# Patient Record
Sex: FEMALE | Race: WHITE | NOT HISPANIC OR LATINO
[De-identification: names, ages, dates, MRNs, and addresses within clinical notes are randomized per-mention and may not be internally consistent; named-entity substitution may affect disease eponyms.]

---

## 2017-03-29 ENCOUNTER — APPOINTMENT (OUTPATIENT)
Dept: PEDIATRIC RHEUMATOLOGY | Facility: CLINIC | Age: 22
End: 2017-03-29

## 2017-03-29 VITALS
HEIGHT: 68.9 IN | BODY MASS INDEX: 21.68 KG/M2 | WEIGHT: 146.39 LBS | DIASTOLIC BLOOD PRESSURE: 76 MMHG | HEART RATE: 70 BPM | TEMPERATURE: 99 F | SYSTOLIC BLOOD PRESSURE: 122 MMHG

## 2017-03-30 LAB
ALBUMIN SERPL ELPH-MCNC: 4.8 G/DL
ALP BLD-CCNC: 71 U/L
ALT SERPL-CCNC: 26 U/L
ANION GAP SERPL CALC-SCNC: 17 MMOL/L
AST SERPL-CCNC: 36 U/L
BASOPHILS # BLD AUTO: 0.02 K/UL
BASOPHILS NFR BLD AUTO: 0.1 %
BILIRUB SERPL-MCNC: 0.5 MG/DL
BUN SERPL-MCNC: 16 MG/DL
CALCIUM SERPL-MCNC: 10 MG/DL
CHLORIDE SERPL-SCNC: 101 MMOL/L
CO2 SERPL-SCNC: 23 MMOL/L
CREAT SERPL-MCNC: 0.88 MG/DL
CRP SERPL-MCNC: <0.2 MG/DL
EOSINOPHIL # BLD AUTO: 0.05 K/UL
EOSINOPHIL NFR BLD AUTO: 0.3 %
ERYTHROCYTE [SEDIMENTATION RATE] IN BLOOD BY WESTERGREN METHOD: 7 MM/HR
GLUCOSE SERPL-MCNC: 78 MG/DL
HCT VFR BLD CALC: 39.9 %
HGB BLD-MCNC: 12.9 G/DL
IMM GRANULOCYTES NFR BLD AUTO: 0.3 %
LYMPHOCYTES # BLD AUTO: 3.08 K/UL
LYMPHOCYTES NFR BLD AUTO: 21.4 %
MAN DIFF?: NORMAL
MCHC RBC-ENTMCNC: 26.4 PG
MCHC RBC-ENTMCNC: 32.3 GM/DL
MCV RBC AUTO: 81.6 FL
MONOCYTES # BLD AUTO: 0.57 K/UL
MONOCYTES NFR BLD AUTO: 4 %
NEUTROPHILS # BLD AUTO: 10.62 K/UL
NEUTROPHILS NFR BLD AUTO: 73.9 %
PLATELET # BLD AUTO: 307 K/UL
POTASSIUM SERPL-SCNC: 4.4 MMOL/L
PROT SERPL-MCNC: 7.5 G/DL
RBC # BLD: 4.89 M/UL
RBC # FLD: 13.2 %
SODIUM SERPL-SCNC: 141 MMOL/L
WBC # FLD AUTO: 14.38 K/UL

## 2017-04-25 ENCOUNTER — MEDICATION RENEWAL (OUTPATIENT)
Age: 22
End: 2017-04-25

## 2017-05-05 ENCOUNTER — RX RENEWAL (OUTPATIENT)
Age: 22
End: 2017-05-05

## 2017-06-15 ENCOUNTER — APPOINTMENT (OUTPATIENT)
Dept: PEDIATRIC RHEUMATOLOGY | Facility: CLINIC | Age: 22
End: 2017-06-15

## 2017-06-15 VITALS
BODY MASS INDEX: 21.36 KG/M2 | HEIGHT: 68.78 IN | HEART RATE: 69 BPM | TEMPERATURE: 98.5 F | WEIGHT: 144.18 LBS | SYSTOLIC BLOOD PRESSURE: 122 MMHG | DIASTOLIC BLOOD PRESSURE: 80 MMHG

## 2017-07-26 ENCOUNTER — MEDICATION RENEWAL (OUTPATIENT)
Age: 22
End: 2017-07-26

## 2017-07-26 ENCOUNTER — OTHER (OUTPATIENT)
Age: 22
End: 2017-07-26

## 2017-07-28 ENCOUNTER — OTHER (OUTPATIENT)
Age: 22
End: 2017-07-28

## 2017-08-10 ENCOUNTER — RX RENEWAL (OUTPATIENT)
Age: 22
End: 2017-08-10

## 2017-09-18 ENCOUNTER — RX RENEWAL (OUTPATIENT)
Age: 22
End: 2017-09-18

## 2017-09-21 ENCOUNTER — APPOINTMENT (OUTPATIENT)
Dept: PEDIATRIC RHEUMATOLOGY | Facility: CLINIC | Age: 22
End: 2017-09-21
Payer: MEDICAID

## 2017-09-21 VITALS
HEART RATE: 76 BPM | SYSTOLIC BLOOD PRESSURE: 118 MMHG | DIASTOLIC BLOOD PRESSURE: 74 MMHG | WEIGHT: 145.95 LBS | HEIGHT: 68.98 IN | BODY MASS INDEX: 21.62 KG/M2 | TEMPERATURE: 98.7 F

## 2017-09-21 PROCEDURE — 99215 OFFICE O/P EST HI 40 MIN: CPT

## 2017-09-22 LAB
ALBUMIN SERPL ELPH-MCNC: 4.5 G/DL
ALP BLD-CCNC: 68 U/L
ALT SERPL-CCNC: 16 U/L
ANION GAP SERPL CALC-SCNC: 15 MMOL/L
AST SERPL-CCNC: 21 U/L
BASOPHILS # BLD AUTO: 0.01 K/UL
BASOPHILS NFR BLD AUTO: 0.1 %
BILIRUB SERPL-MCNC: 0.6 MG/DL
BUN SERPL-MCNC: 14 MG/DL
CALCIUM SERPL-MCNC: 9.6 MG/DL
CHLORIDE SERPL-SCNC: 102 MMOL/L
CO2 SERPL-SCNC: 23 MMOL/L
CREAT SERPL-MCNC: 0.85 MG/DL
CRP SERPL-MCNC: <0.2 MG/DL
EOSINOPHIL # BLD AUTO: 0.14 K/UL
EOSINOPHIL NFR BLD AUTO: 1.9 %
ERYTHROCYTE [SEDIMENTATION RATE] IN BLOOD BY WESTERGREN METHOD: 7 MM/HR
GLUCOSE SERPL-MCNC: 90 MG/DL
HCT VFR BLD CALC: 39.6 %
HGB BLD-MCNC: 12.9 G/DL
IMM GRANULOCYTES NFR BLD AUTO: 0.1 %
LYMPHOCYTES # BLD AUTO: 2.02 K/UL
LYMPHOCYTES NFR BLD AUTO: 27.8 %
MAN DIFF?: NORMAL
MCHC RBC-ENTMCNC: 27.3 PG
MCHC RBC-ENTMCNC: 32.6 GM/DL
MCV RBC AUTO: 83.9 FL
MONOCYTES # BLD AUTO: 0.68 K/UL
MONOCYTES NFR BLD AUTO: 9.4 %
NEUTROPHILS # BLD AUTO: 4.41 K/UL
NEUTROPHILS NFR BLD AUTO: 60.7 %
PLATELET # BLD AUTO: 277 K/UL
POTASSIUM SERPL-SCNC: 4.6 MMOL/L
PROT SERPL-MCNC: 6.9 G/DL
RBC # BLD: 4.72 M/UL
RBC # FLD: 12.6 %
SODIUM SERPL-SCNC: 140 MMOL/L
WBC # FLD AUTO: 7.27 K/UL

## 2017-12-21 ENCOUNTER — APPOINTMENT (OUTPATIENT)
Dept: PEDIATRIC RHEUMATOLOGY | Facility: CLINIC | Age: 22
End: 2017-12-21
Payer: MEDICAID

## 2017-12-21 VITALS
SYSTOLIC BLOOD PRESSURE: 126 MMHG | HEIGHT: 69.49 IN | BODY MASS INDEX: 20.97 KG/M2 | WEIGHT: 144.84 LBS | TEMPERATURE: 97.3 F | HEART RATE: 88 BPM | DIASTOLIC BLOOD PRESSURE: 80 MMHG

## 2017-12-21 PROCEDURE — 99215 OFFICE O/P EST HI 40 MIN: CPT

## 2017-12-22 LAB
ALBUMIN SERPL ELPH-MCNC: 4.5 G/DL
ALP BLD-CCNC: 63 U/L
ALT SERPL-CCNC: 16 U/L
ANION GAP SERPL CALC-SCNC: 16 MMOL/L
AST SERPL-CCNC: 24 U/L
BASOPHILS # BLD AUTO: 0.01 K/UL
BASOPHILS NFR BLD AUTO: 0.1 %
BILIRUB SERPL-MCNC: 0.7 MG/DL
BUN SERPL-MCNC: 15 MG/DL
CALCIUM SERPL-MCNC: 9.8 MG/DL
CHLORIDE SERPL-SCNC: 103 MMOL/L
CO2 SERPL-SCNC: 23 MMOL/L
CREAT SERPL-MCNC: 0.86 MG/DL
CRP SERPL-MCNC: <0.2 MG/DL
EOSINOPHIL # BLD AUTO: 0.1 K/UL
EOSINOPHIL NFR BLD AUTO: 1.2
ERYTHROCYTE [SEDIMENTATION RATE] IN BLOOD BY WESTERGREN METHOD: 6 MM/HR
GLUCOSE SERPL-MCNC: 103 MG/DL
HCT VFR BLD CALC: 38.6 %
HGB BLD-MCNC: 12.3 G/DL
IMM GRANULOCYTES NFR BLD AUTO: 0.2 %
LYMPHOCYTES # BLD AUTO: 2.06 K/UL
LYMPHOCYTES NFR BLD AUTO: 25.2 %
MAN DIFF?: NORMAL
MCHC RBC-ENTMCNC: 26.9 PG
MCHC RBC-ENTMCNC: 31.9 GM/DL
MCV RBC AUTO: 84.3 FL
MONOCYTES # BLD AUTO: 0.43 K/UL
MONOCYTES NFR BLD AUTO: 5.3 %
NEUTROPHILS # BLD AUTO: 5.55 K/UL
NEUTROPHILS NFR BLD AUTO: 68 %
PLATELET # BLD AUTO: 281 K/UL
POTASSIUM SERPL-SCNC: 4.2 MMOL/L
PROT SERPL-MCNC: 6.9 G/DL
RBC # BLD: 4.58 M/UL
RBC # FLD: 13 %
SODIUM SERPL-SCNC: 142 MMOL/L
WBC # FLD AUTO: 8.17 K/UL

## 2018-01-02 ENCOUNTER — RX RENEWAL (OUTPATIENT)
Age: 23
End: 2018-01-02

## 2018-01-02 LAB
ADJUSTED MITOGEN: 8.19 IU/ML
ADJUSTED TB AG: 0.01 IU/ML
M TB IFN-G BLD-IMP: NEGATIVE
QUANTIFERON GOLD NIL: 0.02 IU/ML

## 2018-03-28 ENCOUNTER — APPOINTMENT (OUTPATIENT)
Dept: PEDIATRIC RHEUMATOLOGY | Facility: CLINIC | Age: 23
End: 2018-03-28
Payer: MEDICAID

## 2018-03-28 VITALS
DIASTOLIC BLOOD PRESSURE: 81 MMHG | HEIGHT: 69.02 IN | HEART RATE: 70 BPM | WEIGHT: 142.42 LBS | BODY MASS INDEX: 21.09 KG/M2 | SYSTOLIC BLOOD PRESSURE: 119 MMHG | TEMPERATURE: 97.8 F

## 2018-03-28 LAB
ALBUMIN SERPL ELPH-MCNC: 4.9 G/DL
ALP BLD-CCNC: 69 U/L
ALT SERPL-CCNC: 19 U/L
ANION GAP SERPL CALC-SCNC: 15 MMOL/L
AST SERPL-CCNC: 27 U/L
BASOPHILS # BLD AUTO: 0.01 K/UL
BASOPHILS NFR BLD AUTO: 0.1 %
BILIRUB SERPL-MCNC: 0.7 MG/DL
BUN SERPL-MCNC: 17 MG/DL
CALCIUM SERPL-MCNC: 10 MG/DL
CHLORIDE SERPL-SCNC: 103 MMOL/L
CO2 SERPL-SCNC: 22 MMOL/L
CREAT SERPL-MCNC: 0.99 MG/DL
CRP SERPL-MCNC: <0.2 MG/DL
EOSINOPHIL # BLD AUTO: 0.09 K/UL
EOSINOPHIL NFR BLD AUTO: 1.1 %
ERYTHROCYTE [SEDIMENTATION RATE] IN BLOOD BY WESTERGREN METHOD: 15 MM/HR
GLUCOSE SERPL-MCNC: 81 MG/DL
HCT VFR BLD CALC: 41.6 %
HGB BLD-MCNC: 13.6 G/DL
IMM GRANULOCYTES NFR BLD AUTO: 0.1 %
LYMPHOCYTES # BLD AUTO: 2.26 K/UL
LYMPHOCYTES NFR BLD AUTO: 28.5 %
MAN DIFF?: NORMAL
MCHC RBC-ENTMCNC: 26.3 PG
MCHC RBC-ENTMCNC: 32.7 GM/DL
MCV RBC AUTO: 80.5 FL
MONOCYTES # BLD AUTO: 0.48 K/UL
MONOCYTES NFR BLD AUTO: 6 %
NEUTROPHILS # BLD AUTO: 5.09 K/UL
NEUTROPHILS NFR BLD AUTO: 64.2 %
PLATELET # BLD AUTO: 277 K/UL
POTASSIUM SERPL-SCNC: 4.7 MMOL/L
PROT SERPL-MCNC: 7.8 G/DL
RBC # BLD: 5.17 M/UL
RBC # FLD: 13.5 %
SODIUM SERPL-SCNC: 140 MMOL/L
WBC # FLD AUTO: 7.94 K/UL

## 2018-03-28 PROCEDURE — 99204 OFFICE O/P NEW MOD 45 MIN: CPT

## 2018-03-28 RX ORDER — METRONIDAZOLE 7.5 MG/G
0.75 CREAM TOPICAL
Qty: 45 | Refills: 0 | Status: ACTIVE | COMMUNITY
Start: 2017-12-10

## 2018-03-28 RX ORDER — KETOROLAC TROMETHAMINE 5 MG/ML
0.5 SOLUTION OPHTHALMIC
Qty: 5 | Refills: 0 | Status: DISCONTINUED | COMMUNITY
Start: 2017-10-28

## 2018-07-02 ENCOUNTER — RX RENEWAL (OUTPATIENT)
Age: 23
End: 2018-07-02

## 2018-07-05 ENCOUNTER — APPOINTMENT (OUTPATIENT)
Dept: PEDIATRIC RHEUMATOLOGY | Facility: CLINIC | Age: 23
End: 2018-07-05
Payer: MEDICAID

## 2018-07-05 VITALS
HEART RATE: 59 BPM | WEIGHT: 141.1 LBS | TEMPERATURE: 98.8 F | SYSTOLIC BLOOD PRESSURE: 120 MMHG | DIASTOLIC BLOOD PRESSURE: 72 MMHG | BODY MASS INDEX: 20.66 KG/M2 | HEIGHT: 69.25 IN

## 2018-07-05 LAB
BASOPHILS # BLD AUTO: 0.02 K/UL
BASOPHILS NFR BLD AUTO: 0.2 %
EOSINOPHIL # BLD AUTO: 0.17 K/UL
EOSINOPHIL NFR BLD AUTO: 1.6 %
HCT VFR BLD CALC: 41.4 %
HGB BLD-MCNC: 12.6 G/DL
IMM GRANULOCYTES NFR BLD AUTO: 0.2 %
LYMPHOCYTES # BLD AUTO: 2.55 K/UL
LYMPHOCYTES NFR BLD AUTO: 23.5 %
MAN DIFF?: NORMAL
MCHC RBC-ENTMCNC: 25 PG
MCHC RBC-ENTMCNC: 30.4 GM/DL
MCV RBC AUTO: 82.3 FL
MONOCYTES # BLD AUTO: 0.64 K/UL
MONOCYTES NFR BLD AUTO: 5.9 %
NEUTROPHILS # BLD AUTO: 7.47 K/UL
NEUTROPHILS NFR BLD AUTO: 68.6 %
PLATELET # BLD AUTO: 339 K/UL
RBC # BLD: 5.03 M/UL
RBC # FLD: 13.6 %
WBC # FLD AUTO: 10.87 K/UL

## 2018-07-05 PROCEDURE — 99214 OFFICE O/P EST MOD 30 MIN: CPT

## 2018-07-06 LAB
ALBUMIN SERPL ELPH-MCNC: 4.6 G/DL
ALP BLD-CCNC: 71 U/L
ALT SERPL-CCNC: 17 U/L
ANION GAP SERPL CALC-SCNC: 19 MMOL/L
AST SERPL-CCNC: 26 U/L
BILIRUB SERPL-MCNC: 0.4 MG/DL
BUN SERPL-MCNC: 19 MG/DL
CALCIUM SERPL-MCNC: 9.8 MG/DL
CHLORIDE SERPL-SCNC: 103 MMOL/L
CO2 SERPL-SCNC: 19 MMOL/L
CREAT SERPL-MCNC: 0.93 MG/DL
CRP SERPL-MCNC: <0.2 MG/DL
ERYTHROCYTE [SEDIMENTATION RATE] IN BLOOD BY WESTERGREN METHOD: 3 MM/HR
GLUCOSE SERPL-MCNC: 80 MG/DL
POTASSIUM SERPL-SCNC: 4.9 MMOL/L
PROT SERPL-MCNC: 7.1 G/DL
SODIUM SERPL-SCNC: 141 MMOL/L

## 2018-10-10 ENCOUNTER — APPOINTMENT (OUTPATIENT)
Dept: PEDIATRIC RHEUMATOLOGY | Facility: CLINIC | Age: 23
End: 2018-10-10
Payer: COMMERCIAL

## 2018-10-10 VITALS
BODY MASS INDEX: 21.44 KG/M2 | HEART RATE: 66 BPM | WEIGHT: 146.39 LBS | HEIGHT: 69.17 IN | TEMPERATURE: 98.5 F | DIASTOLIC BLOOD PRESSURE: 83 MMHG | SYSTOLIC BLOOD PRESSURE: 136 MMHG

## 2018-10-10 LAB
BASOPHILS # BLD AUTO: 0.01 K/UL
BASOPHILS NFR BLD AUTO: 0.1 %
EOSINOPHIL # BLD AUTO: 0.1 K/UL
EOSINOPHIL NFR BLD AUTO: 1.2 %
HCT VFR BLD CALC: 41.8 %
HGB BLD-MCNC: 13 G/DL
IMM GRANULOCYTES NFR BLD AUTO: 0.2 %
LYMPHOCYTES # BLD AUTO: 2.5 K/UL
LYMPHOCYTES NFR BLD AUTO: 29.2 %
MAN DIFF?: NORMAL
MCHC RBC-ENTMCNC: 25.6 PG
MCHC RBC-ENTMCNC: 31.1 GM/DL
MCV RBC AUTO: 82.3 FL
MONOCYTES # BLD AUTO: 0.52 K/UL
MONOCYTES NFR BLD AUTO: 6.1 %
NEUTROPHILS # BLD AUTO: 5.41 K/UL
NEUTROPHILS NFR BLD AUTO: 63.2 %
PLATELET # BLD AUTO: 328 K/UL
RBC # BLD: 5.08 M/UL
RBC # FLD: 13.9 %
WBC # FLD AUTO: 8.56 K/UL

## 2018-10-10 PROCEDURE — 99215 OFFICE O/P EST HI 40 MIN: CPT

## 2018-10-11 LAB
ALBUMIN SERPL ELPH-MCNC: 4.9 G/DL
ALP BLD-CCNC: 74 U/L
ALT SERPL-CCNC: 25 U/L
ANION GAP SERPL CALC-SCNC: 12 MMOL/L
AST SERPL-CCNC: 38 U/L
BILIRUB SERPL-MCNC: 0.8 MG/DL
BUN SERPL-MCNC: 12 MG/DL
CALCIUM SERPL-MCNC: 10.1 MG/DL
CHLORIDE SERPL-SCNC: 103 MMOL/L
CO2 SERPL-SCNC: 25 MMOL/L
CREAT SERPL-MCNC: 0.94 MG/DL
CRP SERPL-MCNC: <0.1 MG/DL
ERYTHROCYTE [SEDIMENTATION RATE] IN BLOOD BY WESTERGREN METHOD: 12 MM/HR
GLUCOSE SERPL-MCNC: 73 MG/DL
M TB IFN-G BLD-IMP: NEGATIVE
POTASSIUM SERPL-SCNC: 4.6 MMOL/L
PROT SERPL-MCNC: 7.6 G/DL
QUANTIFERON TB PLUS MITOGEN MINUS NIL: >10 IU/ML
QUANTIFERON TB PLUS NIL: 0.06 IU/ML
QUANTIFERON TB PLUS TB1 MINUS NIL: 0 IU/ML
QUANTIFERON TB PLUS TB2 MINUS NIL: 0.02 IU/ML
SODIUM SERPL-SCNC: 140 MMOL/L

## 2019-01-16 ENCOUNTER — APPOINTMENT (OUTPATIENT)
Dept: PEDIATRIC RHEUMATOLOGY | Facility: CLINIC | Age: 24
End: 2019-01-16
Payer: COMMERCIAL

## 2019-01-16 VITALS
TEMPERATURE: 98.4 F | DIASTOLIC BLOOD PRESSURE: 75 MMHG | WEIGHT: 145.73 LBS | BODY MASS INDEX: 21.34 KG/M2 | SYSTOLIC BLOOD PRESSURE: 121 MMHG | HEIGHT: 69.17 IN | HEART RATE: 67 BPM

## 2019-01-16 LAB
ALBUMIN SERPL ELPH-MCNC: 4.9 G/DL
ALP BLD-CCNC: 65 U/L
ALT SERPL-CCNC: 15 U/L
ANION GAP SERPL CALC-SCNC: 10 MMOL/L
AST SERPL-CCNC: 28 U/L
BASOPHILS # BLD AUTO: 0.01 K/UL
BASOPHILS NFR BLD AUTO: 0.2 %
BILIRUB SERPL-MCNC: 0.7 MG/DL
BUN SERPL-MCNC: 13 MG/DL
CALCIUM SERPL-MCNC: 9.8 MG/DL
CHLORIDE SERPL-SCNC: 103 MMOL/L
CO2 SERPL-SCNC: 25 MMOL/L
CREAT SERPL-MCNC: 0.95 MG/DL
CRP SERPL-MCNC: 0.13 MG/DL
EOSINOPHIL # BLD AUTO: 0.09 K/UL
EOSINOPHIL NFR BLD AUTO: 1.4 %
ERYTHROCYTE [SEDIMENTATION RATE] IN BLOOD BY WESTERGREN METHOD: 12 MM/HR
GLUCOSE SERPL-MCNC: 86 MG/DL
HCT VFR BLD CALC: 39.2 %
HGB BLD-MCNC: 12.4 G/DL
IMM GRANULOCYTES NFR BLD AUTO: 0.2 %
LYMPHOCYTES # BLD AUTO: 2.12 K/UL
LYMPHOCYTES NFR BLD AUTO: 32.9 %
MAN DIFF?: NORMAL
MCHC RBC-ENTMCNC: 25.7 PG
MCHC RBC-ENTMCNC: 31.6 GM/DL
MCV RBC AUTO: 81.3 FL
MONOCYTES # BLD AUTO: 0.34 K/UL
MONOCYTES NFR BLD AUTO: 5.3 %
NEUTROPHILS # BLD AUTO: 3.87 K/UL
NEUTROPHILS NFR BLD AUTO: 60 %
PLATELET # BLD AUTO: 280 K/UL
POTASSIUM SERPL-SCNC: 3.9 MMOL/L
PROT SERPL-MCNC: 7.2 G/DL
RBC # BLD: 4.82 M/UL
RBC # FLD: 14 %
SODIUM SERPL-SCNC: 138 MMOL/L
WBC # FLD AUTO: 6.44 K/UL

## 2019-01-16 PROCEDURE — 99213 OFFICE O/P EST LOW 20 MIN: CPT

## 2019-01-16 RX ORDER — ADALIMUMAB 40MG/0.8ML
40 KIT SUBCUTANEOUS
Qty: 0 | Refills: 0 | Status: COMPLETED | OUTPATIENT
Start: 2019-01-16

## 2019-01-16 NOTE — REVIEW OF SYSTEMS
[NI] : Endocrine [Nl] : Hematologic/Lymphatic [Rash] : rash [Menarche] : ~T menarche [Change in Activity] : no change in activity [Fever] : no fever [Eye Pain] : no eye pain [Redness] : no redness [Change in Vision] : no change in vision  [Nasal Stuffiness] : no nasal congestion [Sore Throat] : no sore throat [Exercise Intolerance] : no exercise intolerance [Tachypnea] : no tachypnea [Wheezing] : no wheezing [Cough] : no cough [Shortness of Breath] : no shortness of breath [Change in Appetite] : no change in appetite [Irregular Periods] : no irregular periods [Limping] : no limping [Joint Pains] : no arthralgias [Fainting] : no fainting [Headache] : no headache [Bruising] : no tendency for easy bruising [Swollen Glands] : no lymphadenopathy [Seasonal Allergies] : no seasonal allergies [Smokers in Home] : no one in home smokes [FreeTextEntry1] : [facility]\par Had influenza virus vaccine - Oct. 2014 - Fall 2018

## 2019-01-16 NOTE — SOCIAL HISTORY
[Mother] : mother [Father] : father [College] : College [FreeTextEntry1] : graduating from college in May 2018 - working Full time

## 2019-01-16 NOTE — HISTORY OF PRESENT ILLNESS
[___ Month(s) Ago] : [unfilled] month(s) ago [Cataracts] : cataracts [None] : The patient is currently asymptomatic [Unlimited ADLs] : able to do activities of daily living without limitations [Unlimited Sports] : able to participate in sports without limitations [0] : 0 [Noncontributory] : The patient's family history was noncontributory [de-identified] : Last seen Oct. 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 Hill - Nov. 2018 [FreeTextEntry2] : S/P Cataract surgery- August 2014

## 2019-01-16 NOTE — PHYSICAL EXAM
[Rash] : rash [Conjunctiva] : normal conjunctiva [Eyelids] : normal eyelids [Ears] : normal ears [Gums] : normal gums [Palate] : normal palate [Cardiac Auscultation] : normal cardiac auscultation  [Respiratory Effort] : normal respiratory effort [Auscultation] : lungs clear to auscultation [Liver] : normal liver [Spleen] : normal spleen [Range Of Motion] : full  range of motion [Gait] : normal gait [Normal] : normal [Not Examined] : not examined [0] : 0 [Peripheral Edema] : no peripheral edema  [Tenderness] : non tender [Mass ___ cm] : no masses were palpated [FreeTextEntry1] : well appearing

## 2019-03-23 ENCOUNTER — TRANSCRIPTION ENCOUNTER (OUTPATIENT)
Age: 24
End: 2019-03-23

## 2019-04-18 ENCOUNTER — APPOINTMENT (OUTPATIENT)
Dept: PEDIATRIC RHEUMATOLOGY | Facility: CLINIC | Age: 24
End: 2019-04-18
Payer: COMMERCIAL

## 2019-04-18 VITALS
HEART RATE: 80 BPM | TEMPERATURE: 98.2 F | DIASTOLIC BLOOD PRESSURE: 82 MMHG | SYSTOLIC BLOOD PRESSURE: 148 MMHG | HEIGHT: 69.09 IN | WEIGHT: 149.25 LBS | BODY MASS INDEX: 22.11 KG/M2

## 2019-04-18 LAB
BASOPHILS # BLD AUTO: 0.02 K/UL
BASOPHILS NFR BLD AUTO: 0.2 %
EOSINOPHIL # BLD AUTO: 0.09 K/UL
EOSINOPHIL NFR BLD AUTO: 1.1 %
HCT VFR BLD CALC: 40 %
HGB BLD-MCNC: 12.4 G/DL
IMM GRANULOCYTES NFR BLD AUTO: 0.2 %
LYMPHOCYTES # BLD AUTO: 2 K/UL
LYMPHOCYTES NFR BLD AUTO: 24.5 %
MAN DIFF?: NORMAL
MCHC RBC-ENTMCNC: 26.2 PG
MCHC RBC-ENTMCNC: 31 GM/DL
MCV RBC AUTO: 84.4 FL
MONOCYTES # BLD AUTO: 0.58 K/UL
MONOCYTES NFR BLD AUTO: 7.1 %
NEUTROPHILS # BLD AUTO: 5.45 K/UL
NEUTROPHILS NFR BLD AUTO: 66.9 %
PLATELET # BLD AUTO: 267 K/UL
RBC # BLD: 4.74 M/UL
RBC # FLD: 12.9 %
WBC # FLD AUTO: 8.16 K/UL

## 2019-04-18 PROCEDURE — 99214 OFFICE O/P EST MOD 30 MIN: CPT

## 2019-04-18 NOTE — REVIEW OF SYSTEMS
[NI] : Endocrine [Nl] : Hematologic/Lymphatic [Menarche] : ~T menarche [Fainting] : fainting [Change in Activity] : no change in activity [Fever] : no fever [Eye Pain] : no eye pain [Rash] : no rash [Redness] : no redness [Change in Vision] : no change in vision  [Nasal Stuffiness] : no nasal congestion [Sore Throat] : no sore throat [Tachypnea] : no tachypnea [Exercise Intolerance] : no exercise intolerance [Wheezing] : no wheezing [Cough] : no cough [Change in Appetite] : no change in appetite [Shortness of Breath] : no shortness of breath [Limping] : no limping [Irregular Periods] : no irregular periods [Headache] : no headache [Joint Pains] : no arthralgias [Bruising] : no tendency for easy bruising [Swollen Glands] : no lymphadenopathy [Smokers in Home] : no one in home smokes [Seasonal Allergies] : no seasonal allergies [FreeTextEntry1] : records [facility] at [facility]\par Had influenza virus vaccine - Oct. 2014

## 2019-04-18 NOTE — HISTORY OF PRESENT ILLNESS
[___ Month(s) Ago] : [unfilled] month(s) ago [None] : The patient is currently asymptomatic [Cataracts] : cataracts [Unlimited ADLs] : able to do activities of daily living without limitations [Unlimited Sports] : able to participate in sports without limitations [0] : 0 [de-identified] : Last seen Jan 2019 [FreeTextEntry1] : 4-18-19\par Back on Humira as had a flare of the eyes - Humira every 4 weeks\par \par Recently seen ophthalmologist - Dr Hill and he has recommended seeing Dr Egan as uncertain how to wean her further given her recent flare\par needs a new authorization for her Humira and was unable to get the last order because of this\par No major complaints - no joint pain or swelling\par No rash\par No intercurrent illnesses \par Asking about transition to an adult MD\par \par \par  [FreeTextEntry4] : Dr. Hill - April 2019 [FreeTextEntry3] : 2008 [FreeTextEntry2] : S/P Cataract surgery- August 2014

## 2019-04-18 NOTE — PHYSICAL EXAM
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

## 2019-04-18 NOTE — SOCIAL HISTORY
[Mother] : mother [Father] : father [College] : College [FreeTextEntry1] : graduating from college in May 2018

## 2019-04-19 ENCOUNTER — OTHER (OUTPATIENT)
Age: 24
End: 2019-04-19

## 2019-04-19 LAB
ALBUMIN SERPL ELPH-MCNC: 4.8 G/DL
ALP BLD-CCNC: 69 U/L
ALT SERPL-CCNC: 22 U/L
ANION GAP SERPL CALC-SCNC: 12 MMOL/L
AST SERPL-CCNC: 31 U/L
BILIRUB SERPL-MCNC: 0.7 MG/DL
BUN SERPL-MCNC: 13 MG/DL
CALCIUM SERPL-MCNC: 9.5 MG/DL
CHLORIDE SERPL-SCNC: 103 MMOL/L
CO2 SERPL-SCNC: 25 MMOL/L
CREAT SERPL-MCNC: 0.86 MG/DL
CRP SERPL-MCNC: 0.11 MG/DL
ERYTHROCYTE [SEDIMENTATION RATE] IN BLOOD BY WESTERGREN METHOD: 22 MM/HR
GLUCOSE SERPL-MCNC: 85 MG/DL
POTASSIUM SERPL-SCNC: 4.4 MMOL/L
PROT SERPL-MCNC: 7.1 G/DL
SODIUM SERPL-SCNC: 140 MMOL/L

## 2019-04-24 ENCOUNTER — OTHER (OUTPATIENT)
Age: 24
End: 2019-04-24

## 2019-04-29 ENCOUNTER — APPOINTMENT (OUTPATIENT)
Dept: OPHTHALMOLOGY | Facility: CLINIC | Age: 24
End: 2019-04-29
Payer: COMMERCIAL

## 2019-04-29 DIAGNOSIS — H20.11 CHRONIC IRIDOCYCLITIS, RIGHT EYE: ICD-10-CM

## 2019-04-29 DIAGNOSIS — H20.10 CHRONIC IRIDOCYCLITIS, UNSPECIFIED EYE: ICD-10-CM

## 2019-04-29 DIAGNOSIS — Z96.1 PRESENCE OF INTRAOCULAR LENS: ICD-10-CM

## 2019-04-29 PROCEDURE — 92004 COMPRE OPH EXAM NEW PT 1/>: CPT

## 2019-05-25 ENCOUNTER — LABORATORY RESULT (OUTPATIENT)
Age: 24
End: 2019-05-25

## 2019-05-28 LAB
B BURGDOR IGG+IGM SER QL IB: NORMAL
CONFIRM: 25.4 SEC
DEPRECATED KAPPA LC FREE/LAMBDA SER: 0.8 RATIO
DRVVT IMM 1:2 NP PPP: NORMAL
DRVVT SCREEN TO CONFIRM RATIO: 0.79 RATIO
IGA SER QL IEP: 180 MG/DL
IGG SER QL IEP: 713 MG/DL
IGM SER QL IEP: 201 MG/DL
KAPPA LC CSF-MCNC: 0.91 MG/DL
KAPPA LC SERPL-MCNC: 0.73 MG/DL
MPO AB + PR3 PNL SER: NORMAL
SCREEN DRVVT: 25 SEC
T GONDII AB SER-IMP: NEGATIVE
T GONDII IGG SER QL: <3 IU/ML
TSH SERPL-ACNC: 1.72 UIU/ML

## 2019-05-29 LAB
ACE BLD-CCNC: 68 U/L
B HENSELAE IGG SER-ACNC: NORMAL TITER
B HENSELAE IGM SER QL: NORMAL TITER
B QUINTANA IGG SER QL: NORMAL TITER
B QUINTANA IGM SER QL: NORMAL TITER
HLA-B27 RELATED AG QL: NORMAL

## 2019-05-31 LAB — T CANIS AB FLD-ACNC: POSITIVE

## 2019-06-01 LAB
ADALIMUMAB AB SERPL-MCNC: 40 NG/ML
ADALIMUMAB SERPL-MCNC: 3.4 UG/ML

## 2019-06-07 ENCOUNTER — APPOINTMENT (OUTPATIENT)
Dept: OPHTHALMOLOGY | Facility: CLINIC | Age: 24
End: 2019-06-07

## 2019-07-01 ENCOUNTER — RX RENEWAL (OUTPATIENT)
Age: 24
End: 2019-07-01

## 2019-08-21 ENCOUNTER — APPOINTMENT (OUTPATIENT)
Dept: PEDIATRIC RHEUMATOLOGY | Facility: CLINIC | Age: 24
End: 2019-08-21
Payer: COMMERCIAL

## 2019-08-21 VITALS
DIASTOLIC BLOOD PRESSURE: 82 MMHG | TEMPERATURE: 97.8 F | HEIGHT: 69.37 IN | SYSTOLIC BLOOD PRESSURE: 130 MMHG | HEART RATE: 62 BPM | WEIGHT: 148 LBS | BODY MASS INDEX: 21.67 KG/M2

## 2019-08-21 LAB
ALBUMIN SERPL ELPH-MCNC: 4.5 G/DL
ALP BLD-CCNC: 58 U/L
ALT SERPL-CCNC: 13 U/L
ANION GAP SERPL CALC-SCNC: 12 MMOL/L
AST SERPL-CCNC: 21 U/L
BASOPHILS # BLD AUTO: 0.02 K/UL
BASOPHILS NFR BLD AUTO: 0.3 %
BILIRUB SERPL-MCNC: 0.6 MG/DL
BUN SERPL-MCNC: 16 MG/DL
CALCIUM SERPL-MCNC: 10 MG/DL
CHLORIDE SERPL-SCNC: 103 MMOL/L
CO2 SERPL-SCNC: 24 MMOL/L
CREAT SERPL-MCNC: 0.92 MG/DL
CRP SERPL-MCNC: <0.1 MG/DL
EOSINOPHIL # BLD AUTO: 0.12 K/UL
EOSINOPHIL NFR BLD AUTO: 1.6 %
ERYTHROCYTE [SEDIMENTATION RATE] IN BLOOD BY WESTERGREN METHOD: 8 MM/HR
GLUCOSE SERPL-MCNC: 90 MG/DL
HCT VFR BLD CALC: 40.3 %
HGB BLD-MCNC: 12.5 G/DL
IMM GRANULOCYTES NFR BLD AUTO: 0.3 %
LYMPHOCYTES # BLD AUTO: 2.39 K/UL
LYMPHOCYTES NFR BLD AUTO: 31.8 %
MAN DIFF?: NORMAL
MCHC RBC-ENTMCNC: 26.8 PG
MCHC RBC-ENTMCNC: 31 GM/DL
MCV RBC AUTO: 86.3 FL
MONOCYTES # BLD AUTO: 0.46 K/UL
MONOCYTES NFR BLD AUTO: 6.1 %
NEUTROPHILS # BLD AUTO: 4.5 K/UL
NEUTROPHILS NFR BLD AUTO: 59.9 %
PLATELET # BLD AUTO: 319 K/UL
POTASSIUM SERPL-SCNC: 5 MMOL/L
PROT SERPL-MCNC: 6.9 G/DL
RBC # BLD: 4.67 M/UL
RBC # FLD: 13 %
SODIUM SERPL-SCNC: 139 MMOL/L
WBC # FLD AUTO: 7.51 K/UL

## 2019-08-21 PROCEDURE — 99215 OFFICE O/P EST HI 40 MIN: CPT

## 2019-08-22 NOTE — PHYSICAL EXAM
[Rash] : rash [Conjunctiva] : normal conjunctiva [Eyelids] : normal eyelids [Ears] : normal ears [Gums] : normal gums [Palate] : normal palate [Cardiac Auscultation] : normal cardiac auscultation  [Respiratory Effort] : normal respiratory effort [Auscultation] : lungs clear to auscultation [Liver] : normal liver [Spleen] : normal spleen [Range Of Motion] : full  range of motion [Gait] : normal gait [Normal] : normal [0] : 0 [Not Examined] : not examined [Peripheral Edema] : no peripheral edema  [Tenderness] : non tender [Mass ___ cm] : no masses were palpated [FreeTextEntry1] : well appearing [de-identified] : Has a small 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

## 2019-08-22 NOTE — REVIEW OF SYSTEMS
[NI] : Endocrine [Nl] : Hematologic/Lymphatic [Menarche] : ~T menarche [Fainting] : fainting [Fever] : no fever [Change in Activity] : no change in activity [Rash] : no rash [Eye Pain] : no eye pain [Redness] : no redness [Change in Vision] : no change in vision  [Nasal Stuffiness] : no nasal congestion [Exercise Intolerance] : no exercise intolerance [Sore Throat] : no sore throat [Wheezing] : no wheezing [Tachypnea] : no tachypnea [Cough] : no cough [Shortness of Breath] : no shortness of breath [Change in Appetite] : no change in appetite [Irregular Periods] : no irregular periods [Limping] : no limping [Joint Pains] : no arthralgias [Headache] : no headache [Bruising] : no tendency for easy bruising [Swollen Glands] : no lymphadenopathy [Seasonal Allergies] : no seasonal allergies [Smokers in Home] : no one in home smokes [FreeTextEntry1] : [facility] at [facility]\par Had influenza virus vaccine - Oct. 2014

## 2019-08-22 NOTE — HISTORY OF PRESENT ILLNESS
[___ Month(s) Ago] : [unfilled] month(s) ago [Cataracts] : cataracts [None] : The patient is currently asymptomatic [Unlimited ADLs] : able to do activities of daily living without limitations [Unlimited Sports] : able to participate in sports without limitations [0] : 0 [de-identified] : Last seen May 2019 [FreeTextEntry1] : 8-21-19\par Back on Humira as of had a flare of the eyes in July 2017 - Humira every 4 weeks\par \par Recently seen ophthalmologist - Dr Hill and he has recommended seeing Dr Egan as uncertain how to wean her further given her recent flare\par Saw Karen in April 2019 who noted that she may be flaring\par he recommended starting another medication such as Cellcept However after discussion with the family re side effects they decided not to take the medicine and therefore her Humira was increased to every 2 weeks\par 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 Hill - April 2019 [FreeTextEntry2] : S/P Cataract surgery- August 2014

## 2019-11-01 ENCOUNTER — RX RENEWAL (OUTPATIENT)
Age: 24
End: 2019-11-01

## 2019-11-27 ENCOUNTER — APPOINTMENT (OUTPATIENT)
Dept: PEDIATRIC RHEUMATOLOGY | Facility: CLINIC | Age: 24
End: 2019-11-27
Payer: COMMERCIAL

## 2019-11-27 VITALS
DIASTOLIC BLOOD PRESSURE: 80 MMHG | BODY MASS INDEX: 21.77 KG/M2 | HEART RATE: 80 BPM | SYSTOLIC BLOOD PRESSURE: 120 MMHG | TEMPERATURE: 98.2 F | HEIGHT: 68.94 IN | WEIGHT: 146.98 LBS

## 2019-11-27 DIAGNOSIS — Z00.00 ENCOUNTER FOR GENERAL ADULT MEDICAL EXAMINATION W/OUT ABNORMAL FINDINGS: ICD-10-CM

## 2019-11-27 PROCEDURE — 99215 OFFICE O/P EST HI 40 MIN: CPT

## 2019-11-27 NOTE — HISTORY OF PRESENT ILLNESS
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 Hill - April 2019 [FreeTextEntry2] : S/P Cataract surgery- August 2014

## 2019-11-27 NOTE — PHYSICAL EXAM
[Conjunctiva] : normal conjunctiva [Eyelids] : normal eyelids [Ears] : normal ears [Gums] : normal gums [Palate] : normal palate [Cardiac Auscultation] : normal cardiac auscultation  [Respiratory Effort] : normal respiratory effort [Auscultation] : lungs clear to auscultation [Liver] : normal liver [Spleen] : normal spleen [Range Of Motion] : full  range of motion [Gait] : normal gait [Normal] : normal [Not Examined] : not examined [0] : 0 [Rash] : no rash [Peripheral Edema] : no peripheral edema  [Tenderness] : non tender [Mass ___ cm] : no masses were palpated [FreeTextEntry1] : well appearing

## 2019-11-27 NOTE — REVIEW OF SYSTEMS
[NI] : Endocrine [Nl] : Hematologic/Lymphatic [Menarche] : ~T menarche [Fainting] : fainting [Change in Activity] : no change in activity [Fever] : no fever [Rash] : no rash [Eye Pain] : no eye pain [Redness] : no redness [Change in Vision] : no change in vision  [Nasal Stuffiness] : no nasal congestion [Sore Throat] : no sore throat [Exercise Intolerance] : no exercise intolerance [Tachypnea] : no tachypnea [Wheezing] : no wheezing [Cough] : no cough [Shortness of Breath] : no shortness of breath [Change in Appetite] : no change in appetite [Irregular Periods] : no irregular periods [Limping] : no limping [Joint Pains] : no arthralgias [Headache] : no headache [Bruising] : no tendency for easy bruising [Swollen Glands] : no lymphadenopathy [Seasonal Allergies] : no seasonal allergies [Smokers in Home] : no one in home smokes [FreeTextEntry1] : records kept at [facility]\par Had influenza virus vaccine - Oct. 2014

## 2019-11-29 LAB
ALBUMIN SERPL ELPH-MCNC: 4.8 G/DL
ALP BLD-CCNC: 56 U/L
ALT SERPL-CCNC: 16 U/L
ANION GAP SERPL CALC-SCNC: 14 MMOL/L
AST SERPL-CCNC: 22 U/L
BASOPHILS # BLD AUTO: 0.03 K/UL
BASOPHILS NFR BLD AUTO: 0.3 %
BILIRUB SERPL-MCNC: 0.7 MG/DL
BUN SERPL-MCNC: 15 MG/DL
CALCIUM SERPL-MCNC: 9.8 MG/DL
CHLORIDE SERPL-SCNC: 103 MMOL/L
CO2 SERPL-SCNC: 24 MMOL/L
CREAT SERPL-MCNC: 0.84 MG/DL
CRP SERPL-MCNC: 0.13 MG/DL
EOSINOPHIL # BLD AUTO: 0.09 K/UL
EOSINOPHIL NFR BLD AUTO: 1 %
ERYTHROCYTE [SEDIMENTATION RATE] IN BLOOD BY WESTERGREN METHOD: 31 MM/HR
GLUCOSE SERPL-MCNC: 87 MG/DL
HCT VFR BLD CALC: 41 %
HGB BLD-MCNC: 12.8 G/DL
IMM GRANULOCYTES NFR BLD AUTO: 0.2 %
LYMPHOCYTES # BLD AUTO: 2.04 K/UL
LYMPHOCYTES NFR BLD AUTO: 23.4 %
MAN DIFF?: NORMAL
MCHC RBC-ENTMCNC: 26.1 PG
MCHC RBC-ENTMCNC: 31.2 GM/DL
MCV RBC AUTO: 83.5 FL
MONOCYTES # BLD AUTO: 0.57 K/UL
MONOCYTES NFR BLD AUTO: 6.6 %
NEUTROPHILS # BLD AUTO: 5.95 K/UL
NEUTROPHILS NFR BLD AUTO: 68.5 %
PLATELET # BLD AUTO: 287 K/UL
POTASSIUM SERPL-SCNC: 4.4 MMOL/L
PROT SERPL-MCNC: 7.1 G/DL
RBC # BLD: 4.91 M/UL
RBC # FLD: 13.2 %
SODIUM SERPL-SCNC: 141 MMOL/L
WBC # FLD AUTO: 8.7 K/UL

## 2019-12-03 LAB
ADALIMUMAB AB SERPL-MCNC: <25 NG/ML
ADALIMUMAB SERPL-MCNC: 10 UG/ML

## 2019-12-18 ENCOUNTER — MEDICATION RENEWAL (OUTPATIENT)
Age: 24
End: 2019-12-18

## 2020-01-02 ENCOUNTER — RX RENEWAL (OUTPATIENT)
Age: 25
End: 2020-01-02

## 2020-01-02 ENCOUNTER — RX CHANGE (OUTPATIENT)
Age: 25
End: 2020-01-02

## 2020-02-20 ENCOUNTER — APPOINTMENT (OUTPATIENT)
Dept: PEDIATRIC RHEUMATOLOGY | Facility: CLINIC | Age: 25
End: 2020-02-20
Payer: COMMERCIAL

## 2020-02-20 VITALS
TEMPERATURE: 98.3 F | BODY MASS INDEX: 22.07 KG/M2 | SYSTOLIC BLOOD PRESSURE: 130 MMHG | WEIGHT: 148.99 LBS | DIASTOLIC BLOOD PRESSURE: 80 MMHG | HEART RATE: 67 BPM | HEIGHT: 69.06 IN

## 2020-02-20 LAB
BASOPHILS # BLD AUTO: 0.03 K/UL
BASOPHILS NFR BLD AUTO: 0.5 %
EOSINOPHIL # BLD AUTO: 0.11 K/UL
EOSINOPHIL NFR BLD AUTO: 1.8 %
ERYTHROCYTE [SEDIMENTATION RATE] IN BLOOD BY WESTERGREN METHOD: 13 MM/HR
HCT VFR BLD CALC: 39.6 %
HGB BLD-MCNC: 12.3 G/DL
IMM GRANULOCYTES NFR BLD AUTO: 0.2 %
LYMPHOCYTES # BLD AUTO: 2.02 K/UL
LYMPHOCYTES NFR BLD AUTO: 32.4 %
MAN DIFF?: NORMAL
MCHC RBC-ENTMCNC: 26.4 PG
MCHC RBC-ENTMCNC: 31.1 GM/DL
MCV RBC AUTO: 85 FL
MONOCYTES # BLD AUTO: 0.42 K/UL
MONOCYTES NFR BLD AUTO: 6.7 %
NEUTROPHILS # BLD AUTO: 3.65 K/UL
NEUTROPHILS NFR BLD AUTO: 58.4 %
PLATELET # BLD AUTO: 292 K/UL
RBC # BLD: 4.66 M/UL
RBC # FLD: 13 %
WBC # FLD AUTO: 6.24 K/UL

## 2020-02-20 PROCEDURE — 99215 OFFICE O/P EST HI 40 MIN: CPT

## 2020-02-21 LAB
ALBUMIN SERPL ELPH-MCNC: 4.8 G/DL
ALP BLD-CCNC: 60 U/L
ALT SERPL-CCNC: 22 U/L
ANION GAP SERPL CALC-SCNC: 12 MMOL/L
AST SERPL-CCNC: 25 U/L
BILIRUB SERPL-MCNC: 0.4 MG/DL
BUN SERPL-MCNC: 19 MG/DL
CALCIUM SERPL-MCNC: 10.2 MG/DL
CHLORIDE SERPL-SCNC: 103 MMOL/L
CO2 SERPL-SCNC: 23 MMOL/L
CREAT SERPL-MCNC: 0.87 MG/DL
CRP SERPL-MCNC: <0.1 MG/DL
GLUCOSE SERPL-MCNC: 96 MG/DL
POTASSIUM SERPL-SCNC: 4.8 MMOL/L
PROT SERPL-MCNC: 7.1 G/DL
SODIUM SERPL-SCNC: 139 MMOL/L

## 2020-02-24 LAB
M TB IFN-G BLD-IMP: NEGATIVE
QUANTIFERON TB PLUS MITOGEN MINUS NIL: 9.54 IU/ML
QUANTIFERON TB PLUS NIL: 0.02 IU/ML
QUANTIFERON TB PLUS TB1 MINUS NIL: 0.01 IU/ML
QUANTIFERON TB PLUS TB2 MINUS NIL: 0.01 IU/ML

## 2020-02-24 NOTE — REVIEW OF SYSTEMS
[NI] : Endocrine [Nl] : Hematologic/Lymphatic [Menarche] : ~T menarche [Fainting] : fainting [Change in Activity] : no change in activity [Fever] : no fever [Rash] : no rash [Eye Pain] : no eye pain [Redness] : no redness [Change in Vision] : no change in vision  [Nasal Stuffiness] : no nasal congestion [Sore Throat] : no sore throat [Exercise Intolerance] : no exercise intolerance [Tachypnea] : no tachypnea [Wheezing] : no wheezing [Cough] : no cough [Shortness of Breath] : no shortness of breath [Change in Appetite] : no change in appetite [Irregular Periods] : no irregular periods [Limping] : no limping [Joint Pains] : no arthralgias [Headache] : no headache [Bruising] : no tendency for easy bruising [Swollen Glands] : no lymphadenopathy [Smokers in Home] : no one in home smokes [Seasonal Allergies] : no seasonal allergies [FreeTextEntry1] : records kept at [facility]\par Had influenza virus vaccine - Oct. 2014

## 2020-02-24 NOTE — REVIEW OF SYSTEMS
[NI] : Endocrine [Nl] : Hematologic/Lymphatic [Menarche] : ~T menarche [Fainting] : fainting [Change in Activity] : no change in activity [Fever] : no fever [Rash] : no rash [Eye Pain] : no eye pain [Redness] : no redness [Change in Vision] : no change in vision  [Sore Throat] : no sore throat [Nasal Stuffiness] : no nasal congestion [Exercise Intolerance] : no exercise intolerance [Tachypnea] : no tachypnea [Cough] : no cough [Wheezing] : no wheezing [Shortness of Breath] : no shortness of breath [Change in Appetite] : no change in appetite [Irregular Periods] : no irregular periods [Limping] : no limping [Joint Pains] : no arthralgias [Headache] : no headache [Bruising] : no tendency for easy bruising [Swollen Glands] : no lymphadenopathy [Seasonal Allergies] : no seasonal allergies [Smokers in Home] : no one in home smokes [FreeTextEntry1] : records kept at [facility]\par Had influenza virus vaccine - Oct. 2014

## 2020-02-24 NOTE — PHYSICAL EXAM
[Conjunctiva] : normal conjunctiva [Eyelids] : normal eyelids [Palate] : normal palate [Ears] : normal ears [Gums] : normal gums [Cardiac Auscultation] : normal cardiac auscultation  [Respiratory Effort] : normal respiratory effort [Auscultation] : lungs clear to auscultation [Spleen] : normal spleen [Liver] : normal liver [Range Of Motion] : full  range of motion [Gait] : normal gait [Normal] : normal [0] : 0 [Not Examined] : not examined [Rash] : no rash [Lesions] : no lesions [Ulcers] : no ulcers [Malar Erythema] : no malar erythema [Tenderness] : non tender [Peripheral Edema] : no peripheral edema  [Mass ___ cm] : no masses were palpated [FreeTextEntry1] : well appearing

## 2020-02-24 NOTE — HISTORY OF PRESENT ILLNESS
[___ Month(s) Ago] : [unfilled] month(s) ago [Cataracts] : cataracts [None] : The patient is currently asymptomatic [Unlimited ADLs] : able to do activities of daily living without limitations [Unlimited Sports] : able to participate in sports without limitations [0] : 0 [de-identified] : Last seen Nov 2019 [FreeTextEntry3] : 2008 [FreeTextEntry4] : Dr. 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

## 2020-02-24 NOTE — HISTORY OF PRESENT ILLNESS
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 Hill - April 2019 [FreeTextEntry3] : 2008 [FreeTextEntry2] : S/P Cataract surgery- August 2014

## 2020-02-24 NOTE — PHYSICAL EXAM
[Conjunctiva] : normal conjunctiva [Eyelids] : normal eyelids [Palate] : normal palate [Gums] : normal gums [Ears] : normal ears [Cardiac Auscultation] : normal cardiac auscultation  [Respiratory Effort] : normal respiratory effort [Auscultation] : lungs clear to auscultation [Liver] : normal liver [Spleen] : normal spleen [Range Of Motion] : full  range of motion [Gait] : normal gait [Normal] : normal [Not Examined] : not examined [0] : 0 [Rash] : no rash [Lesions] : no lesions [Ulcers] : no ulcers [Malar Erythema] : no malar erythema [Tenderness] : non tender [Peripheral Edema] : no peripheral edema  [FreeTextEntry1] : well appearing [Mass ___ cm] : no masses were palpated

## 2020-05-04 ENCOUNTER — RX RENEWAL (OUTPATIENT)
Age: 25
End: 2020-05-04

## 2020-05-21 ENCOUNTER — APPOINTMENT (OUTPATIENT)
Dept: PEDIATRIC RHEUMATOLOGY | Facility: CLINIC | Age: 25
End: 2020-05-21
Payer: COMMERCIAL

## 2020-05-21 PROCEDURE — 99214 OFFICE O/P EST MOD 30 MIN: CPT | Mod: 95

## 2020-05-21 NOTE — PHYSICAL EXAM
[Gait] : normal gait [Range Of Motion] : full  range of motion [Grossly Intact] : grossly intact [Normal] : normal [Not Examined] : not examined [1] : 1 [FreeTextEntry1] : IN NAD

## 2020-05-21 NOTE — REVIEW OF SYSTEMS
[NI] : Endocrine [Nl] : Hematologic/Lymphatic [Menarche] : ~T menarche [Fainting] : fainting [Change in Activity] : no change in activity [Rash] : no rash [Fever] : no fever [Eye Pain] : no eye pain [Redness] : no redness [Change in Vision] : no change in vision  [Nasal Stuffiness] : no nasal congestion [Sore Throat] : no sore throat [Exercise Intolerance] : no exercise intolerance [Tachypnea] : no tachypnea [Wheezing] : no wheezing [Cough] : no cough [Shortness of Breath] : no shortness of breath [Irregular Periods] : no irregular periods [Change in Appetite] : no change in appetite [Joint Pains] : no arthralgias [Limping] : no limping [Headache] : no headache [Bruising] : no tendency for easy bruising [Swollen Glands] : no lymphadenopathy [Seasonal Allergies] : no seasonal allergies [FreeTextEntry1] : records kept at [facility]\par Had influenza virus vaccine - Oct. 2014 [Smokers in Home] : no one in home smokes

## 2020-05-21 NOTE — HISTORY OF PRESENT ILLNESS
[Home] : at home, [unfilled] , at the time of the visit. 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\par \par Since doing every 2 weeks Humira she has weaned off her prednisone and drops\par Currently being followed by Dr Hill who said vision was 20/25 and that he wants her on Humira until at least January He is trying to decide what next step in medicine should be\par Has discussed cellcept but os reluctant to take this owing to the teratogenic effects\par No major complaints - no joint pain or swelling\par \par However in March developed anosmia and a persistent cough for 2 weeks with fever for 2 days Her father went to visit his brother at that time and he developed a cough and this spread to his brothers family and his brothers wife an OBGYN tested positive for COVID19\par No rash\par \par Asking about transition to an adult MD\par  [de-identified] : Last seen Feb 2020 [FreeTextEntry3] : 2008 [FreeTextEntry2] : S/P Cataract surgery- August 2014 [FreeTextEntry4] : Dr. Hill - April 2019

## 2020-05-22 LAB
ALBUMIN SERPL ELPH-MCNC: 5.1 G/DL
ALP BLD-CCNC: 70 U/L
ALT SERPL-CCNC: 24 U/L
ANION GAP SERPL CALC-SCNC: 14 MMOL/L
AST SERPL-CCNC: 29 U/L
BASOPHILS # BLD AUTO: 0.01 K/UL
BASOPHILS NFR BLD AUTO: 0.1 %
BILIRUB SERPL-MCNC: 0.8 MG/DL
BUN SERPL-MCNC: 13 MG/DL
CALCIUM SERPL-MCNC: 10.1 MG/DL
CHLORIDE SERPL-SCNC: 101 MMOL/L
CO2 SERPL-SCNC: 26 MMOL/L
CREAT SERPL-MCNC: 0.89 MG/DL
CRP SERPL-MCNC: <0.1 MG/DL
EOSINOPHIL # BLD AUTO: 0.13 K/UL
EOSINOPHIL NFR BLD AUTO: 1.9 %
ERYTHROCYTE [SEDIMENTATION RATE] IN BLOOD BY WESTERGREN METHOD: 21 MM/HR
GLUCOSE SERPL-MCNC: 98 MG/DL
HCT VFR BLD CALC: 45.1 %
HGB BLD-MCNC: 13.6 G/DL
IMM GRANULOCYTES NFR BLD AUTO: 0.1 %
LYMPHOCYTES # BLD AUTO: 2.07 K/UL
LYMPHOCYTES NFR BLD AUTO: 30.9 %
MAN DIFF?: NORMAL
MCHC RBC-ENTMCNC: 26.6 PG
MCHC RBC-ENTMCNC: 30.2 GM/DL
MCV RBC AUTO: 88.3 FL
MONOCYTES # BLD AUTO: 0.4 K/UL
MONOCYTES NFR BLD AUTO: 6 %
NEUTROPHILS # BLD AUTO: 4.08 K/UL
NEUTROPHILS NFR BLD AUTO: 61 %
PLATELET # BLD AUTO: 253 K/UL
POTASSIUM SERPL-SCNC: 4.2 MMOL/L
PROT SERPL-MCNC: 7.3 G/DL
RBC # BLD: 5.11 M/UL
RBC # FLD: 13.1 %
SARS-COV-2 IGG SERPL IA-ACNC: 0.8 INDEX
SARS-COV-2 IGG SERPL QL IA: NEGATIVE
SODIUM SERPL-SCNC: 141 MMOL/L
WBC # FLD AUTO: 6.7 K/UL

## 2020-05-27 LAB
M TB IFN-G BLD-IMP: NEGATIVE
QUANTIFERON TB PLUS MITOGEN MINUS NIL: 6.82 IU/ML
QUANTIFERON TB PLUS NIL: 0.01 IU/ML
QUANTIFERON TB PLUS TB1 MINUS NIL: 0 IU/ML
QUANTIFERON TB PLUS TB2 MINUS NIL: 0 IU/ML

## 2020-06-02 LAB
ADALIMUMAB AB SERPL-MCNC: <25 NG/ML
ADALIMUMAB SERPL-MCNC: 10 UG/ML

## 2020-08-06 ENCOUNTER — APPOINTMENT (OUTPATIENT)
Dept: PEDIATRIC RHEUMATOLOGY | Facility: CLINIC | Age: 25
End: 2020-08-06
Payer: COMMERCIAL

## 2020-08-06 PROCEDURE — 99442: CPT
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
## 2020-08-13 NOTE — REVIEW OF SYSTEMS
[NI] : Endocrine [Nl] : Hematologic/Lymphatic [Menarche] : ~T menarche [Fainting] : fainting [Change in Activity] : no change in activity [Rash] : no rash [Fever] : no fever [Eye Pain] : no eye pain [Change in Vision] : no change in vision  [Redness] : no redness [Sore Throat] : no sore throat [Nasal Stuffiness] : no nasal congestion [Tachypnea] : no tachypnea [Exercise Intolerance] : no exercise intolerance [Wheezing] : no wheezing [Shortness of Breath] : no shortness of breath [Cough] : no cough [Change in Appetite] : no change in appetite [Irregular Periods] : no irregular periods [Joint Pains] : no arthralgias [Limping] : no limping [Headache] : no headache [Bruising] : no tendency for easy bruising [Swollen Glands] : no lymphadenopathy [Seasonal Allergies] : no seasonal allergies [Smokers in Home] : no one in home smokes [FreeTextEntry1] : records kept [facility]\par Had influenza virus vaccine - Oct. 2014

## 2020-08-16 LAB
ALBUMIN SERPL ELPH-MCNC: 5 G/DL
ALP BLD-CCNC: 58 U/L
ALT SERPL-CCNC: 17 U/L
ANION GAP SERPL CALC-SCNC: 12 MMOL/L
AST SERPL-CCNC: 28 U/L
BASOPHILS # BLD AUTO: 0.02 K/UL
BASOPHILS NFR BLD AUTO: 0.3 %
BILIRUB SERPL-MCNC: 0.7 MG/DL
BUN SERPL-MCNC: 17 MG/DL
CALCIUM SERPL-MCNC: 9.9 MG/DL
CHLORIDE SERPL-SCNC: 105 MMOL/L
CO2 SERPL-SCNC: 24 MMOL/L
CREAT SERPL-MCNC: 0.9 MG/DL
CRP SERPL-MCNC: <0.1 MG/DL
EOSINOPHIL # BLD AUTO: 0.18 K/UL
EOSINOPHIL NFR BLD AUTO: 2.3 %
ERYTHROCYTE [SEDIMENTATION RATE] IN BLOOD BY WESTERGREN METHOD: 5 MM/HR
GLUCOSE SERPL-MCNC: 90 MG/DL
HCT VFR BLD CALC: 40.8 %
HGB BLD-MCNC: 12.5 G/DL
IMM GRANULOCYTES NFR BLD AUTO: 0.3 %
LYMPHOCYTES # BLD AUTO: 2.91 K/UL
LYMPHOCYTES NFR BLD AUTO: 37.4 %
MAN DIFF?: NORMAL
MCHC RBC-ENTMCNC: 26.5 PG
MCHC RBC-ENTMCNC: 30.6 GM/DL
MCV RBC AUTO: 86.4 FL
MONOCYTES # BLD AUTO: 0.51 K/UL
MONOCYTES NFR BLD AUTO: 6.6 %
NEUTROPHILS # BLD AUTO: 4.14 K/UL
NEUTROPHILS NFR BLD AUTO: 53.1 %
PLATELET # BLD AUTO: 277 K/UL
POTASSIUM SERPL-SCNC: 4.2 MMOL/L
PROT SERPL-MCNC: 7 G/DL
RBC # BLD: 4.72 M/UL
RBC # FLD: 12.7 %
SODIUM SERPL-SCNC: 141 MMOL/L
WBC # FLD AUTO: 7.78 K/UL

## 2020-11-11 ENCOUNTER — APPOINTMENT (OUTPATIENT)
Dept: PEDIATRIC RHEUMATOLOGY | Facility: CLINIC | Age: 25
End: 2020-11-11
Payer: COMMERCIAL

## 2020-11-11 VITALS
HEIGHT: 69.13 IN | WEIGHT: 148 LBS | SYSTOLIC BLOOD PRESSURE: 132 MMHG | DIASTOLIC BLOOD PRESSURE: 76 MMHG | HEART RATE: 71 BPM | TEMPERATURE: 97.6 F | BODY MASS INDEX: 21.67 KG/M2

## 2020-11-11 PROCEDURE — 99215 OFFICE O/P EST HI 40 MIN: CPT

## 2020-11-11 PROCEDURE — 99072 ADDL SUPL MATRL&STAF TM PHE: CPT

## 2020-11-12 NOTE — PHYSICAL EXAM
[Conjunctiva] : normal conjunctiva [Pupils] : pupils were equal and round [Ears] : normal ears [Gums] : normal gums [Oropharynx] : normal oropharynx [Palate] : normal palate [Cardiac Auscultation] : normal cardiac auscultation  [Respiratory Effort] : normal respiratory effort [Auscultation] : lungs clear to auscultation [Liver] : normal liver [Spleen] : normal spleen [Range Of Motion] : full  range of motion [Gait] : normal gait [Grossly Intact] : grossly intact [Normal] : normal [Not Examined] : not examined [Rash] : no rash [Lesions] : no lesions [Malar Erythema] : no malar erythema [Ulcers] : no ulcers [Erythematous] : not erythematous [Peripheral Edema] : no peripheral edema  [Tenderness] : non tender [Mass ___ cm] : no masses were palpated [FreeTextEntry1] : In NAD

## 2020-11-12 NOTE — REVIEW OF SYSTEMS
[NI] : Endocrine [Nl] : Hematologic/Lymphatic [Menarche] : ~T menarche [Change in Activity] : no change in activity [Fever] : no fever [Rash] : no rash [Eye Pain] : no eye pain [Redness] : no redness [Change in Vision] : no change in vision  [Nasal Stuffiness] : no nasal congestion [Sore Throat] : no sore throat [Exercise Intolerance] : no exercise intolerance [Tachypnea] : no tachypnea [Wheezing] : no wheezing [Cough] : no cough [Shortness of Breath] : no shortness of breath [Change in Appetite] : no change in appetite [Irregular Periods] : no irregular periods [Limping] : no limping [Joint Pains] : no arthralgias [Fainting] : no fainting [Headache] : no headache [Bruising] : no tendency for easy bruising [Swollen Glands] : no lymphadenopathy [Seasonal Allergies] : no seasonal allergies [Smokers in Home] : no one in home smokes [FreeTextEntry1] : records kept at [facility]\par

## 2020-11-12 NOTE — HISTORY OF PRESENT ILLNESS
[___ Month(s) Ago] : [unfilled] month(s) ago [Cataracts] : cataracts [None] : The patient is currently asymptomatic [Unlimited ADLs] : able to do activities of daily living without limitations [Unlimited Sports] : able to participate in sports without limitations [0] : 0 [Home] : at home, [unfilled] , at the time of the visit. [Other Location: e.g. Home (Enter Location, City,State)___] : at [unfilled] [Verbal consent obtained from patient] : the patient, [unfilled] [de-identified] : Last seen Aug 2020 [FreeTextEntry1] : 11-11-20\par \par Back on Humira as had a flare of the eyes in May 2019 - Humira every 4 weeks currently\par \par 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\par \par Currently being followed by Dr Hill who said vision was 20/25 and that he wants her on Humira and to get a second opinion He is trying to decide what next step in medicine should be\par Has discussed cellcept but pt is reluctant to take this owing to the teratogenic effects\par he suggested a second opinion with Dr Egan Family had seen him in the past and instead went to [place] to see Dr Harrison\par He did extensive blood work all of which was essentially negative or normal\par He suggested continuing on Humira as she is doing and currently no need to add in or change her meds\par No major complaints - no joint pain or swelling\par COVID\par However in March developed anosmia and a persistent cough for 2 weeks with fever for 2 days Her father went to visit his brother at that time and he developed a cough and this spread to his brothers family and his brothers wife an OBGYN tested positive for COVID19. 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 Hill - April 2019 [FreeTextEntry2] : S/P Cataract surgery- August 2014

## 2021-02-17 ENCOUNTER — APPOINTMENT (OUTPATIENT)
Dept: PEDIATRIC RHEUMATOLOGY | Facility: CLINIC | Age: 26
End: 2021-02-17
Payer: COMMERCIAL

## 2021-02-17 ENCOUNTER — NON-APPOINTMENT (OUTPATIENT)
Age: 26
End: 2021-02-17

## 2021-02-17 VITALS
DIASTOLIC BLOOD PRESSURE: 80 MMHG | BODY MASS INDEX: 21.52 KG/M2 | TEMPERATURE: 98.1 F | WEIGHT: 146.98 LBS | SYSTOLIC BLOOD PRESSURE: 130 MMHG | HEIGHT: 69.29 IN | HEART RATE: 80 BPM

## 2021-02-17 LAB
BASOPHILS # BLD AUTO: 0.02 K/UL
BASOPHILS NFR BLD AUTO: 0.3 %
EOSINOPHIL # BLD AUTO: 0.13 K/UL
EOSINOPHIL NFR BLD AUTO: 1.7 %
HCT VFR BLD CALC: 39.4 %
HGB BLD-MCNC: 12.3 G/DL
IMM GRANULOCYTES NFR BLD AUTO: 0.3 %
LYMPHOCYTES # BLD AUTO: 2.04 K/UL
LYMPHOCYTES NFR BLD AUTO: 26.7 %
MAN DIFF?: NORMAL
MCHC RBC-ENTMCNC: 25.6 PG
MCHC RBC-ENTMCNC: 31.2 GM/DL
MCV RBC AUTO: 82.1 FL
MONOCYTES # BLD AUTO: 0.54 K/UL
MONOCYTES NFR BLD AUTO: 7.1 %
NEUTROPHILS # BLD AUTO: 4.89 K/UL
NEUTROPHILS NFR BLD AUTO: 63.9 %
PLATELET # BLD AUTO: 300 K/UL
RBC # BLD: 4.8 M/UL
RBC # FLD: 12.9 %
WBC # FLD AUTO: 7.64 K/UL

## 2021-02-17 PROCEDURE — 99215 OFFICE O/P EST HI 40 MIN: CPT

## 2021-02-17 PROCEDURE — 99072 ADDL SUPL MATRL&STAF TM PHE: CPT

## 2021-02-17 NOTE — REVIEW OF SYSTEMS
[Change in Activity] : no change in activity [Rash] : no rash [Fever] : no fever [Eye Pain] : no eye pain [Redness] : no redness [Change in Vision] : no change in vision  [Nasal Stuffiness] : no nasal congestion [Sore Throat] : no sore throat [Exercise Intolerance] : no exercise intolerance [Tachypnea] : no tachypnea [Wheezing] : no wheezing [Cough] : no cough [Shortness of Breath] : no shortness of breath [Change in Appetite] : no change in appetite [Irregular Periods] : no irregular periods [Limping] : no limping [Joint Pains] : no arthralgias [Fainting] : no fainting [Headache] : no headache [Bruising] : no tendency for easy bruising [Swollen Glands] : no lymphadenopathy [Seasonal Allergies] : no seasonal allergies [Smokers in Home] : no one in home smokes [FreeTextEntry1] : records kept at [facility]\par

## 2021-02-17 NOTE — PHYSICAL EXAM
[Rash] : no rash [Lesions] : no lesions [Ulcers] : no ulcers [Malar Erythema] : no malar erythema [Peripheral Edema] : no peripheral edema  [Tenderness] : non tender [Mass ___ cm] : no masses were palpated [FreeTextEntry1] : In NAD

## 2021-02-17 NOTE — HISTORY OF PRESENT ILLNESS
[de-identified] : Last seen Nov 2020 [FreeTextEntry1] : 2-17-21\par \par Back on Humira as had a flare of the eyes in May 2019 - Humira every 4 weeks currently\par \par 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\par \par Currently being followed by Dr Hill who said vision was 20/25 and that he wants her on Humira and to get a second opinion He is trying to decide what next step in medicine should be\par Has discussed cellcept but pt is reluctant to take this owing to the teratogenic effects\par he suggested a second opinion with Dr Egan Family had seen him in the past and instead went to [place] to see Dr Harrison\par He did extensive blood work all of which was essentially negative or normal\par He suggested continuing on Humira as she is doing and currently no need to add in or change her meds\par No major complaints - no joint pain or swelling\par COVID\par However in March 2020 developed anosmia and a persistent cough for 2 weeks with fever for 2 days Her father went to visit his brother at that time and he developed a cough and this spread to his brothers family and his brothers wife an OBGYN tested positive for COVID19. 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 Hill - April 2019 [FreeTextEntry2] : S/P Cataract surgery- August 2014

## 2021-02-18 LAB
ALBUMIN SERPL ELPH-MCNC: 5 G/DL
ALP BLD-CCNC: 64 U/L
ALT SERPL-CCNC: 16 U/L
ANION GAP SERPL CALC-SCNC: 12 MMOL/L
AST SERPL-CCNC: 27 U/L
BILIRUB SERPL-MCNC: 0.7 MG/DL
BUN SERPL-MCNC: 11 MG/DL
CALCIUM SERPL-MCNC: 10 MG/DL
CHLORIDE SERPL-SCNC: 104 MMOL/L
CO2 SERPL-SCNC: 23 MMOL/L
CREAT SERPL-MCNC: 0.97 MG/DL
CRP SERPL-MCNC: <0.1 MG/DL
ERYTHROCYTE [SEDIMENTATION RATE] IN BLOOD BY WESTERGREN METHOD: 18 MM/HR
GLUCOSE SERPL-MCNC: 92 MG/DL
POTASSIUM SERPL-SCNC: 4.1 MMOL/L
PROT SERPL-MCNC: 7.6 G/DL
SODIUM SERPL-SCNC: 139 MMOL/L

## 2021-02-19 LAB
M TB IFN-G BLD-IMP: NEGATIVE
QUANTIFERON TB PLUS MITOGEN MINUS NIL: 9.11 IU/ML
QUANTIFERON TB PLUS NIL: 0.02 IU/ML
QUANTIFERON TB PLUS TB1 MINUS NIL: 0 IU/ML
QUANTIFERON TB PLUS TB2 MINUS NIL: 0.01 IU/ML

## 2021-03-04 ENCOUNTER — NON-APPOINTMENT (OUTPATIENT)
Age: 26
End: 2021-03-04

## 2021-03-15 ENCOUNTER — RX RENEWAL (OUTPATIENT)
Age: 26
End: 2021-03-15

## 2021-03-31 ENCOUNTER — NON-APPOINTMENT (OUTPATIENT)
Age: 26
End: 2021-03-31

## 2021-04-02 ENCOUNTER — NON-APPOINTMENT (OUTPATIENT)
Age: 26
End: 2021-04-02

## 2021-04-14 ENCOUNTER — NON-APPOINTMENT (OUTPATIENT)
Age: 26
End: 2021-04-14

## 2021-05-12 ENCOUNTER — APPOINTMENT (OUTPATIENT)
Dept: PEDIATRIC RHEUMATOLOGY | Facility: CLINIC | Age: 26
End: 2021-05-12
Payer: COMMERCIAL

## 2021-05-12 VITALS
HEIGHT: 69.29 IN | TEMPERATURE: 97.8 F | DIASTOLIC BLOOD PRESSURE: 92 MMHG | BODY MASS INDEX: 20.79 KG/M2 | HEART RATE: 83 BPM | SYSTOLIC BLOOD PRESSURE: 144 MMHG | WEIGHT: 141.98 LBS

## 2021-05-12 VITALS — DIASTOLIC BLOOD PRESSURE: 80 MMHG | SYSTOLIC BLOOD PRESSURE: 146 MMHG | HEART RATE: 78 BPM

## 2021-05-12 LAB
ALBUMIN SERPL ELPH-MCNC: 5.3 G/DL
ALP BLD-CCNC: 72 U/L
ALT SERPL-CCNC: 21 U/L
ANION GAP SERPL CALC-SCNC: 14 MMOL/L
AST SERPL-CCNC: 26 U/L
BASOPHILS # BLD AUTO: 0.01 K/UL
BASOPHILS NFR BLD AUTO: 0.1 %
BILIRUB SERPL-MCNC: 0.6 MG/DL
BUN SERPL-MCNC: 20 MG/DL
CALCIUM SERPL-MCNC: 10 MG/DL
CHLORIDE SERPL-SCNC: 102 MMOL/L
CO2 SERPL-SCNC: 25 MMOL/L
CREAT SERPL-MCNC: 0.93 MG/DL
CRP SERPL-MCNC: <3 MG/L
EOSINOPHIL # BLD AUTO: 0.07 K/UL
EOSINOPHIL NFR BLD AUTO: 0.8 %
ERYTHROCYTE [SEDIMENTATION RATE] IN BLOOD BY WESTERGREN METHOD: 11 MM/HR
GLUCOSE SERPL-MCNC: 87 MG/DL
HCT VFR BLD CALC: 41.5 %
HGB BLD-MCNC: 12.7 G/DL
IMM GRANULOCYTES NFR BLD AUTO: 0.4 %
LYMPHOCYTES # BLD AUTO: 1.98 K/UL
LYMPHOCYTES NFR BLD AUTO: 23.2 %
MAN DIFF?: NORMAL
MCHC RBC-ENTMCNC: 25.4 PG
MCHC RBC-ENTMCNC: 30.6 GM/DL
MCV RBC AUTO: 83 FL
MONOCYTES # BLD AUTO: 0.53 K/UL
MONOCYTES NFR BLD AUTO: 6.2 %
NEUTROPHILS # BLD AUTO: 5.9 K/UL
NEUTROPHILS NFR BLD AUTO: 69.3 %
PLATELET # BLD AUTO: 365 K/UL
POTASSIUM SERPL-SCNC: 4.1 MMOL/L
PROT SERPL-MCNC: 7.6 G/DL
RBC # BLD: 5 M/UL
RBC # FLD: 13.2 %
SODIUM SERPL-SCNC: 141 MMOL/L
WBC # FLD AUTO: 8.52 K/UL

## 2021-05-12 PROCEDURE — 99214 OFFICE O/P EST MOD 30 MIN: CPT

## 2021-05-12 PROCEDURE — 99072 ADDL SUPL MATRL&STAF TM PHE: CPT

## 2021-05-12 NOTE — PHYSICAL EXAM
[Pupils] : pupils were equal and round [Ears] : normal ears [Cardiac Auscultation] : normal cardiac auscultation  [Respiratory Effort] : normal respiratory effort [Gait] : normal gait [PERRLA] : EDWARD [S1, S2 Present] : S1, S2 present [Clear to auscultation] : clear to auscultation [Soft] : soft [NonTender] : non tender [Non Distended] : non distended [Normal Bowel Sounds] : normal bowel sounds [No Hepatosplenomegaly] : no hepatosplenomegaly [No Abnormal Lymph Nodes Palpated] : no abnormal lymph nodes palpated [Range Of Motion] : full range of motion [Intact Judgement] : intact judgement  [Insight Insight] : intact insight [Acute distress] : no acute distress [Rash] : no rash [Ulcers] : no ulcers [Malar Erythema] : no malar erythema [Erythematous Conjunctiva] : nonerythematous conjunctiva [Erythematous Oropharynx] : nonerythematous oropharynx [Lesions] : no lesions [Murmurs] : no murmurs [Peripheral Edema] : no peripheral edema  [Joint effusions] : no joint effusions [FreeTextEntry1] : In NAD

## 2021-05-12 NOTE — HISTORY OF PRESENT ILLNESS
[None] : The patient is currently asymptomatic [Unlimited ADLs] : able to do activities of daily living without limitations [Unlimited Sports] : able to participate in sports without limitations [FreeTextEntry1] : 5-12-21\par Doing well\par Saw Dr Hill in February and eyes stable on 4 weekly Humira\par He suggested continuing on that dose\par She has had no eye symptoms such as redness or change in vision and has had no side effects from her medications\par She will see Dr Hill again in 2 weeks time\par \par COVID\par received both doses of COVID19 vaccince-Pfizer\par Mild symptoms with 2nd vaccine\par \par GENERAL SYMPTOMS\par Remains well\par

## 2021-05-17 ENCOUNTER — RX RENEWAL (OUTPATIENT)
Age: 26
End: 2021-05-17

## 2021-05-24 LAB
ADALIMUMAB AB SERPL-MCNC: <25 NG/ML
ADALIMUMAB SERPL-MCNC: 2.8 UG/ML

## 2021-08-18 ENCOUNTER — APPOINTMENT (OUTPATIENT)
Dept: PEDIATRIC RHEUMATOLOGY | Facility: CLINIC | Age: 26
End: 2021-08-18
Payer: COMMERCIAL

## 2021-08-18 VITALS
WEIGHT: 161 LBS | TEMPERATURE: 97.6 F | HEIGHT: 69.49 IN | DIASTOLIC BLOOD PRESSURE: 75 MMHG | SYSTOLIC BLOOD PRESSURE: 130 MMHG | BODY MASS INDEX: 23.31 KG/M2 | HEART RATE: 90 BPM

## 2021-08-18 PROCEDURE — 99215 OFFICE O/P EST HI 40 MIN: CPT

## 2021-08-18 NOTE — HISTORY OF PRESENT ILLNESS
[None] : The patient is currently asymptomatic [Unlimited ADLs] : able to do activities of daily living without limitations [Unlimited Sports] : able to participate in sports without limitations [FreeTextEntry1] : 8-18-21\par Doing well\par Saw Dr Hill in May and eyes stable on 4 weekly Humira\par He suggested continuing on that dose\par She has had no eye symptoms such as redness or change in vision and has had no side effects from her medications\par has now moved out of home and living in [place]\par \par COVID\par received both doses of COVID19 vaccince-Pfizer\par Mild symptoms with 2nd vaccine\par Does qualify for the booster shot if wants to take it\par \par GENERAL SYMPTOMS\par Remains well\par

## 2021-08-18 NOTE — PHYSICAL EXAM
[PERRLA] : EDWARD [Pupils] : pupils were equal and round [Ears] : normal ears [S1, S2 Present] : S1, S2 present [Cardiac Auscultation] : normal cardiac auscultation  [Respiratory Effort] : normal respiratory effort [Clear to auscultation] : clear to auscultation [Soft] : soft [NonTender] : non tender [Non Distended] : non distended [Normal Bowel Sounds] : normal bowel sounds [No Hepatosplenomegaly] : no hepatosplenomegaly [No Abnormal Lymph Nodes Palpated] : no abnormal lymph nodes palpated [Range Of Motion] : full range of motion [Gait] : normal gait [Intact Judgement] : intact judgement  [Insight Insight] : intact insight [Acute distress] : no acute distress [Rash] : no rash [Ulcers] : no ulcers [Malar Erythema] : no malar erythema [Erythematous Conjunctiva] : nonerythematous conjunctiva [Erythematous Oropharynx] : nonerythematous oropharynx [Lesions] : no lesions [Murmurs] : no murmurs [Peripheral Edema] : no peripheral edema  [Joint effusions] : no joint effusions [FreeTextEntry1] : In NAD

## 2021-08-19 LAB
ALBUMIN SERPL ELPH-MCNC: 4.9 G/DL
ALP BLD-CCNC: 70 U/L
ALT SERPL-CCNC: 18 U/L
ANION GAP SERPL CALC-SCNC: 14 MMOL/L
AST SERPL-CCNC: 31 U/L
BASOPHILS # BLD AUTO: 0.03 K/UL
BASOPHILS NFR BLD AUTO: 0.3 %
BILIRUB SERPL-MCNC: 0.6 MG/DL
BUN SERPL-MCNC: 17 MG/DL
CALCIUM SERPL-MCNC: 10.1 MG/DL
CHLORIDE SERPL-SCNC: 104 MMOL/L
CO2 SERPL-SCNC: 24 MMOL/L
CREAT SERPL-MCNC: 1.1 MG/DL
CRP SERPL-MCNC: <3 MG/L
EOSINOPHIL # BLD AUTO: 0.11 K/UL
EOSINOPHIL NFR BLD AUTO: 1 %
ERYTHROCYTE [SEDIMENTATION RATE] IN BLOOD BY WESTERGREN METHOD: 11 MM/HR
GLUCOSE SERPL-MCNC: 88 MG/DL
HCT VFR BLD CALC: 42.8 %
HGB BLD-MCNC: 12.8 G/DL
IMM GRANULOCYTES NFR BLD AUTO: 0.4 %
LYMPHOCYTES # BLD AUTO: 1.79 K/UL
LYMPHOCYTES NFR BLD AUTO: 15.8 %
MAN DIFF?: NORMAL
MCHC RBC-ENTMCNC: 25.2 PG
MCHC RBC-ENTMCNC: 29.9 GM/DL
MCV RBC AUTO: 84.4 FL
MONOCYTES # BLD AUTO: 0.51 K/UL
MONOCYTES NFR BLD AUTO: 4.5 %
NEUTROPHILS # BLD AUTO: 8.87 K/UL
NEUTROPHILS NFR BLD AUTO: 78 %
PLATELET # BLD AUTO: 322 K/UL
POTASSIUM SERPL-SCNC: 4.6 MMOL/L
PROT SERPL-MCNC: 7.3 G/DL
RBC # BLD: 5.07 M/UL
RBC # FLD: 15.4 %
SODIUM SERPL-SCNC: 142 MMOL/L
WBC # FLD AUTO: 11.36 K/UL

## 2021-08-25 LAB
ADALIMUMAB AB SERPL-MCNC: <25 NG/ML
ADALIMUMAB SERPL-MCNC: 4.7 UG/ML

## 2021-08-30 ENCOUNTER — RX RENEWAL (OUTPATIENT)
Age: 26
End: 2021-08-30

## 2021-10-01 ENCOUNTER — RX RENEWAL (OUTPATIENT)
Age: 26
End: 2021-10-01

## 2021-11-24 ENCOUNTER — APPOINTMENT (OUTPATIENT)
Dept: PEDIATRIC RHEUMATOLOGY | Facility: CLINIC | Age: 26
End: 2021-11-24
Payer: COMMERCIAL

## 2021-11-24 VITALS
WEIGHT: 163.58 LBS | TEMPERATURE: 97.7 F | HEART RATE: 102 BPM | BODY MASS INDEX: 23.68 KG/M2 | DIASTOLIC BLOOD PRESSURE: 87 MMHG | SYSTOLIC BLOOD PRESSURE: 126 MMHG | HEIGHT: 69.49 IN

## 2021-11-24 LAB
ALBUMIN SERPL ELPH-MCNC: 5 G/DL
ALP BLD-CCNC: 63 U/L
ALT SERPL-CCNC: 16 U/L
ANION GAP SERPL CALC-SCNC: 14 MMOL/L
AST SERPL-CCNC: 22 U/L
BASOPHILS # BLD AUTO: 0.03 K/UL
BASOPHILS NFR BLD AUTO: 0.3 %
BILIRUB SERPL-MCNC: 0.6 MG/DL
BUN SERPL-MCNC: 15 MG/DL
CALCIUM SERPL-MCNC: 10 MG/DL
CHLORIDE SERPL-SCNC: 104 MMOL/L
CO2 SERPL-SCNC: 23 MMOL/L
CREAT SERPL-MCNC: 1 MG/DL
CRP SERPL-MCNC: <3 MG/L
EOSINOPHIL # BLD AUTO: 0.12 K/UL
EOSINOPHIL NFR BLD AUTO: 1.1 %
ERYTHROCYTE [SEDIMENTATION RATE] IN BLOOD BY WESTERGREN METHOD: 29 MM/HR
GLUCOSE SERPL-MCNC: 79 MG/DL
HCT VFR BLD CALC: 42.9 %
HGB BLD-MCNC: 13.6 G/DL
IMM GRANULOCYTES NFR BLD AUTO: 0.4 %
LYMPHOCYTES # BLD AUTO: 2.25 K/UL
LYMPHOCYTES NFR BLD AUTO: 21 %
MAN DIFF?: NORMAL
MCHC RBC-ENTMCNC: 28 PG
MCHC RBC-ENTMCNC: 31.7 GM/DL
MCV RBC AUTO: 88.5 FL
MONOCYTES # BLD AUTO: 0.6 K/UL
MONOCYTES NFR BLD AUTO: 5.6 %
NEUTROPHILS # BLD AUTO: 7.69 K/UL
NEUTROPHILS NFR BLD AUTO: 71.6 %
PLATELET # BLD AUTO: 316 K/UL
POTASSIUM SERPL-SCNC: 4.6 MMOL/L
PROT SERPL-MCNC: 7.5 G/DL
RBC # BLD: 4.85 M/UL
RBC # FLD: 13.2 %
SODIUM SERPL-SCNC: 140 MMOL/L
WBC # FLD AUTO: 10.73 K/UL

## 2021-11-24 PROCEDURE — 99214 OFFICE O/P EST MOD 30 MIN: CPT

## 2021-11-24 NOTE — HISTORY OF PRESENT ILLNESS
[None] : The patient is currently asymptomatic [Unlimited ADLs] : able to do activities of daily living without limitations [Unlimited Sports] : able to participate in sports without limitations [FreeTextEntry1] : 11-24-21\par Doing well\par Follows with Dr Hill  and eyes are stable on 4 weekly Humira\par He suggested continuing on that dose\par She has had no eye symptoms such as redness or change in vision and has had no side effects from her medications\par has now moved out of home and living in [place]\par \par COVID\par received both doses of COVID19 vaccine-Pfizer and the booster\par Mild symptoms with 2nd vaccine\par \par \par GENERAL SYMPTOMS\par Remains well\par

## 2021-12-05 LAB
ADALIMUMAB AB SERPL-MCNC: <25 NG/ML
ADALIMUMAB SERPL-MCNC: 2.6 UG/ML

## 2021-12-13 ENCOUNTER — NON-APPOINTMENT (OUTPATIENT)
Age: 26
End: 2021-12-13

## 2021-12-14 ENCOUNTER — TRANSCRIPTION ENCOUNTER (OUTPATIENT)
Age: 26
End: 2021-12-14

## 2022-02-15 ENCOUNTER — RX RENEWAL (OUTPATIENT)
Age: 27
End: 2022-02-15

## 2022-03-02 ENCOUNTER — APPOINTMENT (OUTPATIENT)
Dept: PEDIATRIC RHEUMATOLOGY | Facility: CLINIC | Age: 27
End: 2022-03-02
Payer: COMMERCIAL

## 2022-03-02 VITALS
HEIGHT: 69.29 IN | WEIGHT: 169.54 LBS | BODY MASS INDEX: 24.83 KG/M2 | TEMPERATURE: 97.3 F | SYSTOLIC BLOOD PRESSURE: 121 MMHG | DIASTOLIC BLOOD PRESSURE: 78 MMHG | HEART RATE: 80 BPM

## 2022-03-02 LAB
BASOPHILS # BLD AUTO: 0.03 K/UL
BASOPHILS NFR BLD AUTO: 0.3 %
EOSINOPHIL # BLD AUTO: 0.14 K/UL
EOSINOPHIL NFR BLD AUTO: 1.5 %
HCT VFR BLD CALC: 41.8 %
HGB BLD-MCNC: 12.8 G/DL
IMM GRANULOCYTES NFR BLD AUTO: 0.5 %
LYMPHOCYTES # BLD AUTO: 2.27 K/UL
LYMPHOCYTES NFR BLD AUTO: 24 %
MAN DIFF?: NORMAL
MCHC RBC-ENTMCNC: 27.2 PG
MCHC RBC-ENTMCNC: 30.6 GM/DL
MCV RBC AUTO: 88.9 FL
MONOCYTES # BLD AUTO: 0.39 K/UL
MONOCYTES NFR BLD AUTO: 4.1 %
NEUTROPHILS # BLD AUTO: 6.56 K/UL
NEUTROPHILS NFR BLD AUTO: 69.6 %
PLATELET # BLD AUTO: 314 K/UL
RBC # BLD: 4.7 M/UL
RBC # FLD: 13.3 %
WBC # FLD AUTO: 9.44 K/UL

## 2022-03-02 PROCEDURE — 99215 OFFICE O/P EST HI 40 MIN: CPT

## 2022-03-02 NOTE — HISTORY OF PRESENT ILLNESS
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

## 2022-03-02 NOTE — PHYSICAL EXAM
[Pupils] : pupils were equal and round [Ears] : normal ears [Cardiac Auscultation] : normal cardiac auscultation  [Respiratory Effort] : normal respiratory effort [Gait] : normal gait [Rash] : no rash [Ulcers] : no ulcers [Malar Erythema] : no malar erythema [Peripheral Edema] : no peripheral edema  [FreeTextEntry1] : In NAD [PERRLA] : EDWARD [S1, S2 Present] : S1, S2 present [Clear to auscultation] : clear to auscultation [Soft] : soft [NonTender] : non tender [Non Distended] : non distended [Normal Bowel Sounds] : normal bowel sounds [No Hepatosplenomegaly] : no hepatosplenomegaly [No Abnormal Lymph Nodes Palpated] : no abnormal lymph nodes palpated [Range Of Motion] : full range of motion [Intact Judgement] : intact judgement  [Insight Insight] : intact insight [Acute distress] : no acute distress [Erythematous Conjunctiva] : nonerythematous conjunctiva [Erythematous Oropharynx] : nonerythematous oropharynx [Lesions] : no lesions [Murmurs] : no murmurs [Joint effusions] : no joint effusions [de-identified] : sl rash on extremities

## 2022-03-02 NOTE — REVIEW OF SYSTEMS
[Menarche] : ~T menarche [Change in Activity] : no change in activity [Fever] : no fever [Rash] : no rash [Eye Pain] : no eye pain [Redness] : no redness [Change in Vision] : no change in vision  [Nasal Stuffiness] : no nasal congestion [Sore Throat] : no sore throat [Exercise Intolerance] : no exercise intolerance [Tachypnea] : no tachypnea [Wheezing] : no wheezing [Cough] : no cough [Shortness of Breath] : no shortness of breath [Change in Appetite] : no change in appetite [Irregular Periods] : no irregular periods [Limping] : no limping [Joint Pains] : no arthralgias [Fainting] : no fainting [Headache] : no headache [Bruising] : no tendency for easy bruising [Swollen Glands] : no lymphadenopathy [Seasonal Allergies] : no seasonal allergies [FreeTextEntry1] : records kept at [facility]\par  [NI] : Endocrine [Nl] : Hematologic/Lymphatic [Smokers in Home] : no one in home smokes

## 2022-03-02 NOTE — PHYSICAL EXAM
[Pupils] : pupils were equal and round [Ears] : normal ears [Cardiac Auscultation] : normal cardiac auscultation  [Respiratory Effort] : normal respiratory effort [Gait] : normal gait [Rash] : no rash [Ulcers] : no ulcers [Malar Erythema] : no malar erythema [Peripheral Edema] : no peripheral edema  [FreeTextEntry1] : In NAD [PERRLA] : EDWARD [S1, S2 Present] : S1, S2 present [Clear to auscultation] : clear to auscultation [Soft] : soft [NonTender] : non tender [Non Distended] : non distended [Normal Bowel Sounds] : normal bowel sounds [No Hepatosplenomegaly] : no hepatosplenomegaly [No Abnormal Lymph Nodes Palpated] : no abnormal lymph nodes palpated [Range Of Motion] : full range of motion [Intact Judgement] : intact judgement  [Insight Insight] : intact insight [Acute distress] : no acute distress [Erythematous Conjunctiva] : nonerythematous conjunctiva [Erythematous Oropharynx] : nonerythematous oropharynx [Lesions] : no lesions [Murmurs] : no murmurs [Joint effusions] : no joint effusions [de-identified] : sl rash on extremities

## 2022-03-03 LAB
ALBUMIN SERPL ELPH-MCNC: 4.9 G/DL
ALP BLD-CCNC: 76 U/L
ALT SERPL-CCNC: 16 U/L
ANION GAP SERPL CALC-SCNC: 13 MMOL/L
AST SERPL-CCNC: 24 U/L
BILIRUB SERPL-MCNC: 0.5 MG/DL
BUN SERPL-MCNC: 17 MG/DL
CALCIUM SERPL-MCNC: 10.1 MG/DL
CHLORIDE SERPL-SCNC: 103 MMOL/L
CO2 SERPL-SCNC: 24 MMOL/L
CREAT SERPL-MCNC: 1 MG/DL
CRP SERPL-MCNC: <3 MG/L
EGFR: 80 ML/MIN/1.73M2
ERYTHROCYTE [SEDIMENTATION RATE] IN BLOOD BY WESTERGREN METHOD: 24 MM/HR
GLUCOSE SERPL-MCNC: 95 MG/DL
POTASSIUM SERPL-SCNC: 4.7 MMOL/L
PROT SERPL-MCNC: 7.1 G/DL
SODIUM SERPL-SCNC: 140 MMOL/L

## 2022-03-10 LAB
ADALIMUMAB AB SERPL-MCNC: <25 NG/ML
ADALIMUMAB SERPL-MCNC: 4.5 UG/ML

## 2022-06-08 ENCOUNTER — APPOINTMENT (OUTPATIENT)
Dept: PEDIATRIC RHEUMATOLOGY | Facility: CLINIC | Age: 27
End: 2022-06-08
Payer: COMMERCIAL

## 2022-06-08 VITALS
WEIGHT: 174.61 LBS | HEIGHT: 69.29 IN | DIASTOLIC BLOOD PRESSURE: 79 MMHG | SYSTOLIC BLOOD PRESSURE: 115 MMHG | TEMPERATURE: 98.2 F | BODY MASS INDEX: 25.57 KG/M2 | HEART RATE: 73 BPM

## 2022-06-08 PROCEDURE — 99214 OFFICE O/P EST MOD 30 MIN: CPT

## 2022-06-08 NOTE — PHYSICAL EXAM
[PERRLA] : EDWARD [S1, S2 Present] : S1, S2 present [Clear to auscultation] : clear to auscultation [Soft] : soft [NonTender] : non tender [Non Distended] : non distended [Normal Bowel Sounds] : normal bowel sounds [No Hepatosplenomegaly] : no hepatosplenomegaly [No Abnormal Lymph Nodes Palpated] : no abnormal lymph nodes palpated [Range Of Motion] : full range of motion [Intact Judgement] : intact judgement  [Insight Insight] : intact insight [Acute distress] : no acute distress [Erythematous Conjunctiva] : nonerythematous conjunctiva [Erythematous Oropharynx] : nonerythematous oropharynx [Lesions] : no lesions [Murmurs] : no murmurs [Joint effusions] : no joint effusions [de-identified] : extensive rash mainly on back and back of legs and arms Erythematous and raised

## 2022-06-08 NOTE — HISTORY OF PRESENT ILLNESS
[FreeTextEntry1] : Follow up uveitis and on Humira\par Doing well\par Following with Dr Hill currently on Humira every 4 weeks\par Note from Dr Hill's office says her eyes are quiet and considering weaning off Humira maybe later this year\par His plan was to stop the Humira after the May dose and to have Juan be seen 7-8 weeks after her last dose\par No eye symptoms currently\par \par However Juan has just returned form [facility] where she developed an extensive sun sensitive rash\par She has seen dermatology and been given a 10 day course of prednisone that she just completed and also topical steroid cream and a non \par topical steroid cream\par This has helped the eruption and also with the itching but the rash is still present\par \par No other symptoms- no fever and no joint pain\par \par COVID\par had COVID over Dec / Jan period Had tested negative in December but in Jan tested positive \par Shortly after developed a rash which 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

## 2022-06-09 LAB
ALBUMIN SERPL ELPH-MCNC: 4.8 G/DL
ALP BLD-CCNC: 46 U/L
ALT SERPL-CCNC: 18 U/L
ANION GAP SERPL CALC-SCNC: 12 MMOL/L
AST SERPL-CCNC: 29 U/L
BASOPHILS # BLD AUTO: 0.03 K/UL
BASOPHILS NFR BLD AUTO: 0.2 %
BILIRUB SERPL-MCNC: 0.6 MG/DL
BUN SERPL-MCNC: 14 MG/DL
C3 SERPL-MCNC: 119 MG/DL
C4 SERPL-MCNC: 22 MG/DL
CALCIUM SERPL-MCNC: 10.1 MG/DL
CHLORIDE SERPL-SCNC: 101 MMOL/L
CO2 SERPL-SCNC: 24 MMOL/L
CREAT SERPL-MCNC: 1.11 MG/DL
CRP SERPL-MCNC: <3 MG/L
DEPRECATED KAPPA LC FREE/LAMBDA SER: 1.02 RATIO
EGFR: 71 ML/MIN/1.73M2
EOSINOPHIL # BLD AUTO: 0.09 K/UL
EOSINOPHIL NFR BLD AUTO: 0.7 %
ERYTHROCYTE [SEDIMENTATION RATE] IN BLOOD BY WESTERGREN METHOD: 8 MM/HR
GLUCOSE SERPL-MCNC: 98 MG/DL
HCT VFR BLD CALC: 40.7 %
HGB BLD-MCNC: 13.1 G/DL
IGA SER QL IEP: 217 MG/DL
IGG SER QL IEP: 772 MG/DL
IGM SER QL IEP: 204 MG/DL
IMM GRANULOCYTES NFR BLD AUTO: 0.6 %
KAPPA LC CSF-MCNC: 0.87 MG/DL
KAPPA LC SERPL-MCNC: 0.89 MG/DL
LYMPHOCYTES # BLD AUTO: 2.65 K/UL
LYMPHOCYTES NFR BLD AUTO: 21.2 %
MAN DIFF?: NORMAL
MCHC RBC-ENTMCNC: 28.2 PG
MCHC RBC-ENTMCNC: 32.2 GM/DL
MCV RBC AUTO: 87.7 FL
MONOCYTES # BLD AUTO: 0.53 K/UL
MONOCYTES NFR BLD AUTO: 4.2 %
NEUTROPHILS # BLD AUTO: 9.12 K/UL
NEUTROPHILS NFR BLD AUTO: 73.1 %
PLATELET # BLD AUTO: 293 K/UL
POTASSIUM SERPL-SCNC: 4.4 MMOL/L
PROT SERPL-MCNC: 7.1 G/DL
RBC # BLD: 4.64 M/UL
RBC # FLD: 13.3 %
SODIUM SERPL-SCNC: 137 MMOL/L
WBC # FLD AUTO: 12.5 K/UL

## 2022-06-10 LAB
ANA SER IF-ACNC: NEGATIVE
DSDNA AB SER-ACNC: 256 IU/ML
ENA RNP AB SER IA-ACNC: <0.2 AL
ENA SM AB SER IA-ACNC: <0.2 AL
ENA SS-A AB SER IA-ACNC: <0.2 AL
ENA SS-B AB SER IA-ACNC: <0.2 AL

## 2022-06-13 ENCOUNTER — NON-APPOINTMENT (OUTPATIENT)
Age: 27
End: 2022-06-13

## 2022-06-14 LAB
ADALIMUMAB AB SERPL-MCNC: <25 NG/ML
ADALIMUMAB SERPL-MCNC: 3.2 UG/ML

## 2022-06-27 ENCOUNTER — RX RENEWAL (OUTPATIENT)
Age: 27
End: 2022-06-27

## 2022-09-14 ENCOUNTER — APPOINTMENT (OUTPATIENT)
Dept: PEDIATRIC RHEUMATOLOGY | Facility: CLINIC | Age: 27
End: 2022-09-14

## 2022-09-14 VITALS
WEIGHT: 171.08 LBS | SYSTOLIC BLOOD PRESSURE: 128 MMHG | BODY MASS INDEX: 25.05 KG/M2 | TEMPERATURE: 97.3 F | HEIGHT: 69.29 IN | DIASTOLIC BLOOD PRESSURE: 81 MMHG | HEART RATE: 56 BPM

## 2022-09-14 PROCEDURE — 99214 OFFICE O/P EST MOD 30 MIN: CPT

## 2022-09-15 NOTE — PHYSICAL EXAM
[Rash] : rash [PERRLA] : EDWARD [S1, S2 Present] : S1, S2 present [Clear to auscultation] : clear to auscultation [Soft] : soft [NonTender] : non tender [Non Distended] : non distended [Normal Bowel Sounds] : normal bowel sounds [No Hepatosplenomegaly] : no hepatosplenomegaly [No Abnormal Lymph Nodes Palpated] : no abnormal lymph nodes palpated [Range Of Motion] : full range of motion [Intact Judgement] : intact judgement  [Insight Insight] : intact insight [Acute distress] : no acute distress [Erythematous Conjunctiva] : nonerythematous conjunctiva [Erythematous Oropharynx] : nonerythematous oropharynx [Lesions] : no lesions [Murmurs] : no murmurs [Joint effusions] : no joint effusions

## 2022-09-15 NOTE — HISTORY OF PRESENT ILLNESS
[Rash] : [unfilled] rash: [Unlimited ADLs] : able to do activities of daily living without limitations [Unlimited Sports] : able to participate in sports without limitations [FreeTextEntry1] : No new complaints today. Compliant with Humira 40 mg every four weeks. Improvement in rash from sun poisoning in May, Seen by Dr. Hill who recommended she discontinue the Humira. 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 a ds DNA of 248 \par Her rash has improved but she still has an intermittent rash\par Just had a Humira shot last week [Significant Weakness] : no significant weakness [Calcinosis] : no calcinosis  [Dysphagia] : no dysphagia [Dysphonia] : no dysphonia [Gottron's Papules] : no gottron's papules [Vasculitis] : no vasculitis [Osteoporosis] : no osteoporosis [Cataracts] : no cataract [Glaucoma] : no glaucoma [CNS Disease] : no ~T CNS disease [Seizures] : no seizure [Pericarditis] : no pericarditis [Glomerulonephritis] : no glomerulonephritis [Hypertension] : no ~T hypertension [Antiphospholipid Ab (no clot)] : no antiphospholipid Ab (no clot)  [Antiphospholipid Ab (hx of clot)] : no antiphospholipid Ab (hx of clot)

## 2022-09-15 NOTE — DISCUSSION/SUMMARY
[FreeTextEntry1] : 27yF with chronic uritis on Humira [id number] presents for follow up. Patient has no new concerns today. Physical exam significant for small erythematous papules overlying b/l elbows, may be remnant from sun poisoning in May, or new psoriasis given recent  in Humira frequency. She is currently asymptomatic with next Humira scheduled for Oct 9. Recommend to hold next Humira dose and monitor symptom onset. If flare up occurs, may give Humira as scheduled. Patient plans to transition to adult rheumatology near where she resides in [place]. Recommended she RTC here if she has a flare up and has difficulty making an appointment with adult rheum.

## 2022-10-05 ENCOUNTER — RX RENEWAL (OUTPATIENT)
Age: 27
End: 2022-10-05

## 2022-12-17 ENCOUNTER — OFFICE (OUTPATIENT)
Dept: URBAN - METROPOLITAN AREA CLINIC 12 | Facility: CLINIC | Age: 27
Setting detail: OPHTHALMOLOGY
End: 2022-12-17
Payer: COMMERCIAL

## 2022-12-17 VITALS — HEIGHT: 55 IN

## 2022-12-17 DIAGNOSIS — H20.011: ICD-10-CM

## 2022-12-17 DIAGNOSIS — H35.371: ICD-10-CM

## 2022-12-17 DIAGNOSIS — H35.351: ICD-10-CM

## 2022-12-17 DIAGNOSIS — Z96.1: ICD-10-CM

## 2022-12-17 DIAGNOSIS — H43.393: ICD-10-CM

## 2022-12-17 DIAGNOSIS — H40.043: ICD-10-CM

## 2022-12-17 PROBLEM — G43.009 MIGRAINE-W/O AURA ; BOTH EYES: Status: ACTIVE | Noted: 2022-12-17

## 2022-12-17 PROCEDURE — 99213 OFFICE O/P EST LOW 20 MIN: CPT | Performed by: OPHTHALMOLOGY

## 2022-12-17 ASSESSMENT — REFRACTION_AUTOREFRACTION
OD_SPHERE: -0.75
OS_CYLINDER: SPH
OS_SPHERE: -0.75
OD_CYLINDER: -0.25
OD_AXIS: 029

## 2022-12-17 ASSESSMENT — KERATOMETRY
OS_AXISANGLE_DEGREES: 087
OS_K1POWER_DIOPTERS: 41.75
OD_K2POWER_DIOPTERS: 42.25
METHOD_AUTO_MANUAL: AUTO
OS_K2POWER_DIOPTERS: 42.50
OD_K1POWER_DIOPTERS: 42.00
OD_AXISANGLE_DEGREES: 101

## 2022-12-17 ASSESSMENT — AXIALLENGTH_DERIVED: OD_AL: 24.4693

## 2022-12-17 ASSESSMENT — TONOMETRY
OD_IOP_MMHG: 18
OS_IOP_MMHG: 20

## 2022-12-17 ASSESSMENT — VISUAL ACUITY
OS_BCVA: 20/25
OD_BCVA: 20/25

## 2022-12-17 ASSESSMENT — CONFRONTATIONAL VISUAL FIELD TEST (CVF)
OD_FINDINGS: FULL
OS_FINDINGS: FULL

## 2022-12-17 ASSESSMENT — SPHEQUIV_DERIVED: OD_SPHEQUIV: -0.875

## 2022-12-22 ENCOUNTER — NON-APPOINTMENT (OUTPATIENT)
Age: 27
End: 2022-12-22

## 2023-01-28 ENCOUNTER — OFFICE (OUTPATIENT)
Dept: URBAN - METROPOLITAN AREA CLINIC 12 | Facility: CLINIC | Age: 28
Setting detail: OPHTHALMOLOGY
End: 2023-01-28
Payer: COMMERCIAL

## 2023-01-28 DIAGNOSIS — H35.371: ICD-10-CM

## 2023-01-28 DIAGNOSIS — H40.043: ICD-10-CM

## 2023-01-28 DIAGNOSIS — G43.009: ICD-10-CM

## 2023-01-28 DIAGNOSIS — H20.011: ICD-10-CM

## 2023-01-28 PROCEDURE — 99214 OFFICE O/P EST MOD 30 MIN: CPT | Performed by: OPHTHALMOLOGY

## 2023-01-28 PROCEDURE — 92134 CPTRZ OPH DX IMG PST SGM RTA: CPT | Performed by: OPHTHALMOLOGY

## 2023-01-28 ASSESSMENT — KERATOMETRY
METHOD_AUTO_MANUAL: AUTO
OS_K1POWER_DIOPTERS: 41.75
OD_K2POWER_DIOPTERS: 42.50
OD_K1POWER_DIOPTERS: 41.75
OD_AXISANGLE_DEGREES: 093
OS_AXISANGLE_DEGREES: 085
OS_K2POWER_DIOPTERS: 42.75

## 2023-01-28 ASSESSMENT — REFRACTION_AUTOREFRACTION
OD_CYLINDER: -0.50
OS_CYLINDER: -0.25
OD_SPHERE: -0.25
OS_AXIS: 032
OD_AXIS: 006
OS_SPHERE: -0.50

## 2023-01-28 ASSESSMENT — SPHEQUIV_DERIVED
OD_SPHEQUIV: -0.5
OS_SPHEQUIV: -0.625

## 2023-01-28 ASSESSMENT — CONFRONTATIONAL VISUAL FIELD TEST (CVF)
OS_FINDINGS: FULL
OD_FINDINGS: FULL

## 2023-01-28 ASSESSMENT — AXIALLENGTH_DERIVED
OD_AL: 24.3132
OS_AL: 24.3161

## 2023-01-28 ASSESSMENT — TONOMETRY
OS_IOP_MMHG: 18
OD_IOP_MMHG: 15

## 2023-01-28 ASSESSMENT — VISUAL ACUITY
OD_BCVA: 20/20
OS_BCVA: 20/30+2

## 2023-02-24 ENCOUNTER — RX ONLY (RX ONLY)
Age: 28
End: 2023-02-24

## 2023-02-24 ENCOUNTER — OFFICE (OUTPATIENT)
Dept: URBAN - METROPOLITAN AREA CLINIC 12 | Facility: CLINIC | Age: 28
Setting detail: OPHTHALMOLOGY
End: 2023-02-24
Payer: COMMERCIAL

## 2023-02-24 DIAGNOSIS — H43.393: ICD-10-CM

## 2023-02-24 DIAGNOSIS — H20.011: ICD-10-CM

## 2023-02-24 DIAGNOSIS — G43.009: ICD-10-CM

## 2023-02-24 DIAGNOSIS — H40.043: ICD-10-CM

## 2023-02-24 DIAGNOSIS — H35.371: ICD-10-CM

## 2023-02-24 PROCEDURE — 99213 OFFICE O/P EST LOW 20 MIN: CPT | Performed by: OPHTHALMOLOGY

## 2023-02-24 ASSESSMENT — KERATOMETRY
OD_K2POWER_DIOPTERS: 42.25
METHOD_AUTO_MANUAL: AUTO
OS_AXISANGLE_DEGREES: 089
OD_AXISANGLE_DEGREES: 094
OS_K1POWER_DIOPTERS: 41.75
OS_K2POWER_DIOPTERS: 42.25
OD_K1POWER_DIOPTERS: 42.00

## 2023-02-24 ASSESSMENT — AXIALLENGTH_DERIVED: OD_AL: 24.4693

## 2023-02-24 ASSESSMENT — REFRACTION_AUTOREFRACTION
OS_CYLINDER: -0.25
OD_AXIS: 006
OS_SPHERE: PLANO
OD_CYLINDER: -0.25
OD_SPHERE: -0.75
OS_AXIS: 072

## 2023-02-24 ASSESSMENT — SPHEQUIV_DERIVED: OD_SPHEQUIV: -0.875

## 2023-02-24 ASSESSMENT — VISUAL ACUITY
OD_BCVA: 20/20
OS_BCVA: 20/30-2

## 2023-02-24 ASSESSMENT — CONFRONTATIONAL VISUAL FIELD TEST (CVF)
OD_FINDINGS: FULL
OS_FINDINGS: FULL

## 2023-03-23 ENCOUNTER — RX ONLY (RX ONLY)
Age: 28
End: 2023-03-23

## 2023-03-23 ENCOUNTER — OFFICE (OUTPATIENT)
Dept: URBAN - METROPOLITAN AREA CLINIC 92 | Facility: CLINIC | Age: 28
Setting detail: OPHTHALMOLOGY
End: 2023-03-23
Payer: COMMERCIAL

## 2023-03-23 DIAGNOSIS — H43.393: ICD-10-CM

## 2023-03-23 DIAGNOSIS — H20.011: ICD-10-CM

## 2023-03-23 DIAGNOSIS — Z96.1: ICD-10-CM

## 2023-03-23 DIAGNOSIS — H40.043: ICD-10-CM

## 2023-03-23 DIAGNOSIS — H35.371: ICD-10-CM

## 2023-03-23 DIAGNOSIS — G43.009: ICD-10-CM

## 2023-03-23 PROCEDURE — 99213 OFFICE O/P EST LOW 20 MIN: CPT | Performed by: SPECIALIST

## 2023-03-23 ASSESSMENT — REFRACTION_AUTOREFRACTION
OS_SPHERE: -0.25
OS_AXIS: 136
OS_CYLINDER: +0.25
OD_AXIS: 082
OD_SPHERE: -1.00
OD_CYLINDER: +0.25

## 2023-03-23 ASSESSMENT — VISUAL ACUITY
OD_BCVA: 20/20
OS_BCVA: 20/30+2

## 2023-03-23 ASSESSMENT — KERATOMETRY
OD_K2POWER_DIOPTERS: 43.00
OD_AXISANGLE_DEGREES: 088
OS_AXISANGLE_DEGREES: 090
OS_K1POWER_DIOPTERS: 41.75
OS_K2POWER_DIOPTERS: 42.50
METHOD_AUTO_MANUAL: AUTO
OD_K1POWER_DIOPTERS: 42.25

## 2023-03-23 ASSESSMENT — SPHEQUIV_DERIVED
OD_SPHEQUIV: -0.875
OS_SPHEQUIV: -0.125

## 2023-03-23 ASSESSMENT — TONOMETRY
OD_IOP_MMHG: 14
OS_IOP_MMHG: 14

## 2023-03-23 ASSESSMENT — AXIALLENGTH_DERIVED
OS_AL: 24.159
OD_AL: 24.2733

## 2023-03-23 ASSESSMENT — CONFRONTATIONAL VISUAL FIELD TEST (CVF)
OS_FINDINGS: FULL
OD_FINDINGS: FULL

## 2023-04-15 ENCOUNTER — OFFICE (OUTPATIENT)
Dept: URBAN - METROPOLITAN AREA CLINIC 12 | Facility: CLINIC | Age: 28
Setting detail: OPHTHALMOLOGY
End: 2023-04-15
Payer: COMMERCIAL

## 2023-04-15 DIAGNOSIS — H35.371: ICD-10-CM

## 2023-04-15 DIAGNOSIS — H40.043: ICD-10-CM

## 2023-04-15 DIAGNOSIS — H20.011: ICD-10-CM

## 2023-04-15 DIAGNOSIS — Z96.1: ICD-10-CM

## 2023-04-15 DIAGNOSIS — H43.393: ICD-10-CM

## 2023-04-15 DIAGNOSIS — G43.009: ICD-10-CM

## 2023-04-15 PROCEDURE — 92012 INTRM OPH EXAM EST PATIENT: CPT | Performed by: OPHTHALMOLOGY

## 2023-04-15 ASSESSMENT — REFRACTION_AUTOREFRACTION
OD_CYLINDER: -0.25
OD_AXIS: 174
OS_CYLINDER: -0.25
OD_SPHERE: -0.50
OS_SPHERE: -0.25
OS_AXIS: 045

## 2023-04-15 ASSESSMENT — KERATOMETRY
OD_AXISANGLE_DEGREES: 096
OD_K1POWER_DIOPTERS: 42.25
OD_K2POWER_DIOPTERS: 42.50
METHOD_AUTO_MANUAL: AUTO
OS_AXISANGLE_DEGREES: 088
OS_K2POWER_DIOPTERS: 42.50
OS_K1POWER_DIOPTERS: 41.75

## 2023-04-15 ASSESSMENT — CONFRONTATIONAL VISUAL FIELD TEST (CVF)
OD_FINDINGS: FULL
OS_FINDINGS: FULL

## 2023-04-15 ASSESSMENT — AXIALLENGTH_DERIVED
OD_AL: 24.2674
OS_AL: 24.2615

## 2023-04-15 ASSESSMENT — VISUAL ACUITY
OD_BCVA: 20/20
OS_BCVA: 20/30

## 2023-04-15 ASSESSMENT — SPHEQUIV_DERIVED
OS_SPHEQUIV: -0.375
OD_SPHEQUIV: -0.625

## 2023-04-15 ASSESSMENT — TONOMETRY
OS_IOP_MMHG: 18
OD_IOP_MMHG: 17

## 2023-07-08 ENCOUNTER — OFFICE (OUTPATIENT)
Dept: URBAN - METROPOLITAN AREA CLINIC 12 | Facility: CLINIC | Age: 28
Setting detail: OPHTHALMOLOGY
End: 2023-07-08
Payer: COMMERCIAL

## 2023-07-08 DIAGNOSIS — H43.393: ICD-10-CM

## 2023-07-08 DIAGNOSIS — H35.371: ICD-10-CM

## 2023-07-08 DIAGNOSIS — G43.009: ICD-10-CM

## 2023-07-08 DIAGNOSIS — H40.043: ICD-10-CM

## 2023-07-08 DIAGNOSIS — H20.011: ICD-10-CM

## 2023-07-08 PROCEDURE — 92012 INTRM OPH EXAM EST PATIENT: CPT | Performed by: OPHTHALMOLOGY

## 2023-07-08 ASSESSMENT — REFRACTION_AUTOREFRACTION
OS_AXIS: 061
OS_SPHERE: PLANO
OS_CYLINDER: -0.25
OD_AXIS: 174
OD_CYLINDER: -0.50
OD_SPHERE: -0.50

## 2023-07-08 ASSESSMENT — TONOMETRY
OS_IOP_MMHG: 16
OD_IOP_MMHG: 17

## 2023-07-08 ASSESSMENT — SPHEQUIV_DERIVED: OD_SPHEQUIV: -0.75

## 2023-07-08 ASSESSMENT — AXIALLENGTH_DERIVED: OD_AL: 24.2219

## 2023-07-08 ASSESSMENT — VISUAL ACUITY
OD_BCVA: 20/20
OS_BCVA: 20/50+2

## 2023-07-08 ASSESSMENT — KERATOMETRY
OS_K2POWER_DIOPTERS: 42.25
METHOD_AUTO_MANUAL: AUTO
OD_K1POWER_DIOPTERS: 42.25
OD_K2POWER_DIOPTERS: 43.00
OS_AXISANGLE_DEGREES: 088
OD_AXISANGLE_DEGREES: 096
OS_K1POWER_DIOPTERS: 41.75

## 2023-07-08 ASSESSMENT — CONFRONTATIONAL VISUAL FIELD TEST (CVF)
OS_FINDINGS: FULL
OD_FINDINGS: FULL

## 2023-10-14 ENCOUNTER — OFFICE (OUTPATIENT)
Dept: URBAN - METROPOLITAN AREA CLINIC 12 | Facility: CLINIC | Age: 28
Setting detail: OPHTHALMOLOGY
End: 2023-10-14
Payer: COMMERCIAL

## 2023-10-14 DIAGNOSIS — H04.123: ICD-10-CM

## 2023-10-14 DIAGNOSIS — Z96.1: ICD-10-CM

## 2023-10-14 DIAGNOSIS — H40.043: ICD-10-CM

## 2023-10-14 DIAGNOSIS — H43.393: ICD-10-CM

## 2023-10-14 DIAGNOSIS — G43.009: ICD-10-CM

## 2023-10-14 DIAGNOSIS — H35.371: ICD-10-CM

## 2023-10-14 DIAGNOSIS — H20.011: ICD-10-CM

## 2023-10-14 PROCEDURE — 92014 COMPRE OPH EXAM EST PT 1/>: CPT | Performed by: OPHTHALMOLOGY

## 2023-10-14 PROCEDURE — 92134 CPTRZ OPH DX IMG PST SGM RTA: CPT | Performed by: OPHTHALMOLOGY

## 2023-10-14 ASSESSMENT — SPHEQUIV_DERIVED
OS_SPHEQUIV: -0.625
OD_SPHEQUIV: -0.625

## 2023-10-14 ASSESSMENT — REFRACTION_AUTOREFRACTION
OS_CYLINDER: -0.25
OD_CYLINDER: -0.25
OS_SPHERE: -0.50
OD_AXIS: 024
OD_SPHERE: -0.50
OS_AXIS: 072

## 2023-10-14 ASSESSMENT — KERATOMETRY
OS_K1POWER_DIOPTERS: 41.75
OS_K2POWER_DIOPTERS: 42.00
OD_K2POWER_DIOPTERS: 42.50
OD_AXISANGLE_DEGREES: 098
OD_K1POWER_DIOPTERS: 42.25
OS_AXISANGLE_DEGREES: 087
METHOD_AUTO_MANUAL: AUTO

## 2023-10-14 ASSESSMENT — CONFRONTATIONAL VISUAL FIELD TEST (CVF)
OS_FINDINGS: FULL
OD_FINDINGS: FULL

## 2023-10-14 ASSESSMENT — AXIALLENGTH_DERIVED
OD_AL: 24.2674
OS_AL: 24.4633

## 2023-10-14 ASSESSMENT — TONOMETRY
OS_IOP_MMHG: 16
OD_IOP_MMHG: 14

## 2023-10-14 ASSESSMENT — SUPERFICIAL PUNCTATE KERATITIS (SPK)
OD_SPK: T
OS_SPK: T

## 2023-10-14 ASSESSMENT — VISUAL ACUITY
OD_BCVA: 20/20
OS_BCVA: 20/30

## 2024-02-10 ENCOUNTER — OFFICE (OUTPATIENT)
Dept: URBAN - METROPOLITAN AREA CLINIC 12 | Facility: CLINIC | Age: 29
Setting detail: OPHTHALMOLOGY
End: 2024-02-10
Payer: COMMERCIAL

## 2024-02-10 DIAGNOSIS — H43.393: ICD-10-CM

## 2024-02-10 DIAGNOSIS — G43.009: ICD-10-CM

## 2024-02-10 DIAGNOSIS — H40.043: ICD-10-CM

## 2024-02-10 DIAGNOSIS — H04.123: ICD-10-CM

## 2024-02-10 DIAGNOSIS — H20.011: ICD-10-CM

## 2024-02-10 DIAGNOSIS — Z96.1: ICD-10-CM

## 2024-02-10 DIAGNOSIS — H35.371: ICD-10-CM

## 2024-02-10 PROCEDURE — 92014 COMPRE OPH EXAM EST PT 1/>: CPT | Performed by: OPHTHALMOLOGY

## 2024-02-10 PROCEDURE — 92134 CPTRZ OPH DX IMG PST SGM RTA: CPT | Performed by: OPHTHALMOLOGY

## 2024-02-10 ASSESSMENT — SUPERFICIAL PUNCTATE KERATITIS (SPK)
OD_SPK: T
OS_SPK: T

## 2024-02-10 ASSESSMENT — SPHEQUIV_DERIVED: OD_SPHEQUIV: -0.875

## 2024-02-10 ASSESSMENT — REFRACTION_AUTOREFRACTION
OS_CYLINDER: SPHERE
OD_SPHERE: -0.75
OD_CYLINDER: -0.25
OS_SPHERE: PLANO
OD_AXIS: 19
OS_AXIS: 0

## 2024-02-10 ASSESSMENT — CONFRONTATIONAL VISUAL FIELD TEST (CVF)
OD_FINDINGS: FULL
OS_FINDINGS: FULL

## 2024-02-28 ENCOUNTER — APPOINTMENT (OUTPATIENT)
Dept: ORTHOPEDIC SURGERY | Facility: CLINIC | Age: 29
End: 2024-02-28
Payer: COMMERCIAL

## 2024-02-28 VITALS — WEIGHT: 167 LBS | BODY MASS INDEX: 24.73 KG/M2 | HEIGHT: 69 IN

## 2024-02-28 DIAGNOSIS — M22.2X2 PATELLOFEMORAL DISORDERS, LEFT KNEE: ICD-10-CM

## 2024-02-28 DIAGNOSIS — M25.562 PAIN IN LEFT KNEE: ICD-10-CM

## 2024-02-28 DIAGNOSIS — M25.511 PAIN IN RIGHT SHOULDER: ICD-10-CM

## 2024-02-28 DIAGNOSIS — G25.89 OTHER SPECIFIED EXTRAPYRAMIDAL AND MOVEMENT DISORDERS: ICD-10-CM

## 2024-02-28 PROCEDURE — 73030 X-RAY EXAM OF SHOULDER: CPT | Mod: RT

## 2024-02-28 PROCEDURE — 73564 X-RAY EXAM KNEE 4 OR MORE: CPT | Mod: LT

## 2024-02-28 PROCEDURE — 99204 OFFICE O/P NEW MOD 45 MIN: CPT

## 2024-03-01 NOTE — HISTORY OF PRESENT ILLNESS
[Intermittent] : intermittent [Dull/Aching] : dull/aching [de-identified] : 02/28/2024: FAWAD ERICKSONBENNIE mccarthy [RHD] 28 year old female is here today c/o RIGHT shoulder (x 7 years) and LEFT knee pain x 2 months after squatting with heavy weight. h/o massages and PT for shoulder, no relief. knee pain localized to inferior patella. wearing knee brace PRN. She was evaluated by a different provider about 5 years ago for her shoulder. She attended PT and transitioned to a HEP. She does not take pain medication. Denies numbness, tingling, radiation, prior injury. She works a desk job.

## 2024-03-01 NOTE — DISCUSSION/SUMMARY
[de-identified] : Assessment: Patient and I discussed their symptoms. Discussed findings of today's exam and possible causes of patient's pain. Educated patient on their most probable diagnosis of scapular dyskinesis and patellofemoral. Reviewed possible courses of treatment, and we collaboratively decided best course of treatment at this time will include:   1.Start PT 2.Discussed MRI if no improvement   Instructions: Dx / Natural History The patient was advised of the diagnosis.  The natural history of the pathology was explained in full to the patient in layman's terms.  Several different treatment options were discussed and explained in full to the patient including the risks and benefits of both surgical and non-surgical treatments.  All questions and concerns were answered.   RICE I explained to the patient that rest, ice, compression, and elevation would benefit them.  They may return to activity after follow-up or when they no longer have any pain.   NSAIDs - OTC Patient is to begin over the counter oral anti-inflammatory medications on an as needed basis, as long as there are no medical contraindications.  Patient is counseled on possible GI and blood pressure side effects.     Icing The patient was advised to apply ice (wrapped in a towel or protective covering) to the area daily (20 minutes at a time, 2-4X/day).   Follow up in 6-8 weeks   All of the patient's questions were answered to satisfaction. Diagnoses and potential treatments were reviewed. Patient agreed with the plan and would like to move forward with it.

## 2024-03-01 NOTE — IMAGING
[Right] : right shoulder [Left] : left knee [There are no fractures, subluxations or dislocations. No significant abnormalities are seen] : There are no fractures, subluxations or dislocations. No significant abnormalities are seen [[id number]] : bone island proximal tibia [de-identified] : The patient is a well appearing 28 year year old female of their stated age. Patient ambulates with a normal gait. Negative straight leg raise bilateral   RIGHT Knee:                           ROM:  0-145 degrees   Lachman: Negative Pivot Shift: Negative Anterior Drawer: Negative Posterior Drawer / Sag: Negative Varus Stress 0 degrees: Stable Varus Stress 30 degrees: Stable Valgus Stress 0 degrees: Stable Valgus Stress 30 degrees: Stable Medial Tamera: Negative Lateral Tamera: Negative Patella Glide: 2+ Patella Apprehension: Negative Patella Grind: Negative   Palpation: Medial Joint Line: Nontender Lateral Joint Line: Nontender Medial Collateral Ligament: Nontender Lateral Collateral Ligament/PLC: Nontender Medial patellar facet: TTP Distal Femur: Nontender Proximal Tibia: Nontender Tibial Tubercle: Nontender Distal Pole Patella: Nontender Quadriceps Tendon: Nontender &  Intact Patella Tendon: Nontender &  Intact Medial Distal Hamstring/PES: Nontender Lateral Distal Hamstring: Nontender & Stable Iliotibial Band: Nontender Medial Patellofemoral Ligament: Nontender Adductor: Nontender Proximal GSC-Plantaris: Nontender Calf: Supple & Nontender   Inspection: Deformity: No Erythema: No Ecchymosis: No Abrasions: No Effusion: No Prepatellar Bursitis: No   Neurologic Exam: Sensation L4-S1: Grossly Intact   Motor Exam: Hip Adductors: 4+/5 Quadriceps: 5 out of 5 Hamstrings: 5 out of 5 EHL: 5 out of 5 FHL: 5 out of 5 TA: 5 out of 5 GS: 5 out of 5   Circulatory/Pulses: Dorsalis Pedis: 2+ Posterior Tibialis: 2+   -----   LEFT Knee:                           ROM:  0-145 degrees   Lachman: Negative Pivot Shift: Negative Anterior Drawer: Negative Posterior Drawer / Sag: Negative Varus Stress 0 degrees: Stable Varus Stress 30 degrees: Stable Valgus Stress 0 degrees: Stable Valgus Stress 30 degrees: Stable Medial Tamera: Negative Lateral Tamera: Negative Patella Glide: 2+ Patella Apprehension: Negative Patella Grind: Negative   Palpation: Medial Joint Line: Nontender Lateral Joint Line: Nontender Medial Collateral Ligament: Nontender Lateral Collateral Ligament/PLC: Nontender Medial patellar facet: TTP Distal Femur: Nontender Proximal Tibia: Nontender Tibial Tubercle: Nontender Distal Pole Patella: Nontender Quadriceps Tendon: Nontender &  Intact Patella Tendon: Nontender &  Intact Medial Distal Hamstring/PES: Nontender Lateral Distal Hamstring: Nontender & Stable Iliotibial Band: Nontender Medial Patellofemoral Ligament: Nontender Adductor: Nontender Proximal GSC-Plantaris: Nontender Calf: Supple & Nontender   Inspection: Deformity: No Erythema: No Ecchymosis: No Abrasions: No Effusion: No Prepatellar Bursitis: No   Neurologic Exam: Sensation L4-S1: Grossly Intact   Motor Exam: Hip Adductors: 4+/5 Quadriceps: 5 out of 5 Hamstrings: 5 out of 5 EHL: 5 out of 5 FHL: 5 out of 5 TA: 5 out of 5 GS: 5 out of 5   Circulatory/Pulses: Dorsalis Pedis: 2+ Posterior Tibialis: 2+   X-RAYS of left  knees (4 views including AP, Lateral, Merchant, and Tunnel): no fracture or dislocation  -----  RIGHT Shoulder  Inspection: Scapula Winging: Negative Deformity: None Erythema: None Ecchymosis: None Abrasions: None Effusion: None   Range of Motion: Active Forward Flexion: 160 degrees Passive Forward Flexion: 170 degrees Active IR : L4 Passive ER : 30 degrees   Motor Exam: Forward Flexion: 4+ out of 5 Flexion Plane of Scapula: 5 out of 5 Abduction: 4+ out of 5 Internal Rotation: 5 out of 5 External Rotation: 4+ out of 5 Distal Motor Strength: 5 out of 5   Stability Testing: Anterior: 1+ Posterior: 1+ Sulcus N: 1+ Sulcus ER: 1+   Provocative Tests: Drop Arm: Negative Smith/Impingement: Positive Houghton: Positive X-Arm Adduction: Negative Belly Press: Negative Bear Hug: Negative Lift Off: Negative Apprehension: Negative Relocation: Negative Posterior Load & Shift: Negative   Palpation: AC Joint: TTP Clavicle: Nontender SC Joint: Nontender Bicipital Groove: TTP Coracoid Process: Nontender Pectoralis Minor Tendon: Nontender Pectoralis Major Tendon: Nontender & palpably intact Latissimus Dorsi: Nontender Proximal Humerus: Nontender Scapula Body: Nontender Medial Scapula Border: Nontender Scapula Spine: Nontender   Neurologic Exam:  Sensation to Light Touch Axillary: Grossly intact Ulnar: Grossly intact Radial: Grossly intact Median: Grossly intact   Circulatory/Pulses: Ulnar: 2+ Radial: 2+ CR < 2s   X-RAYS of the RIGHT shoulder (AP, SCAPULAR Y, AND AXILLARY VIEWS): no fracture or dislocation; Bigliani Type 2 acromion; mild degenerative change at ACJ; subacromial spurring

## 2024-05-11 ENCOUNTER — OFFICE (OUTPATIENT)
Dept: URBAN - METROPOLITAN AREA CLINIC 12 | Facility: CLINIC | Age: 29
Setting detail: OPHTHALMOLOGY
End: 2024-05-11
Payer: COMMERCIAL

## 2024-05-11 DIAGNOSIS — H35.351: ICD-10-CM

## 2024-05-11 DIAGNOSIS — H35.371: ICD-10-CM

## 2024-05-11 DIAGNOSIS — H04.123: ICD-10-CM

## 2024-05-11 DIAGNOSIS — H43.393: ICD-10-CM

## 2024-05-11 DIAGNOSIS — G43.009: ICD-10-CM

## 2024-05-11 DIAGNOSIS — H20.011: ICD-10-CM

## 2024-05-11 DIAGNOSIS — H40.043: ICD-10-CM

## 2024-05-11 PROCEDURE — 92134 CPTRZ OPH DX IMG PST SGM RTA: CPT | Performed by: OPHTHALMOLOGY

## 2024-05-11 PROCEDURE — 99213 OFFICE O/P EST LOW 20 MIN: CPT | Performed by: OPHTHALMOLOGY

## 2024-05-11 ASSESSMENT — CONFRONTATIONAL VISUAL FIELD TEST (CVF)
OS_FINDINGS: FULL
OD_FINDINGS: FULL

## 2024-05-18 NOTE — REASON FOR VISIT
[Follow-Up: _____] : a [unfilled] follow-up visit [Patient] : patient [Mother] : mother [[email or web address]_OB_FT:[id number]],[NS_[email or web address]_ALL_OB_FT:[id number]],[NS_DeliveryRN_OBGYN_ALL_OB_FT:[id number]]

## 2024-06-14 ENCOUNTER — OFFICE (OUTPATIENT)
Dept: URBAN - METROPOLITAN AREA CLINIC 12 | Facility: CLINIC | Age: 29
Setting detail: OPHTHALMOLOGY
End: 2024-06-14
Payer: COMMERCIAL

## 2024-06-14 DIAGNOSIS — H35.371: ICD-10-CM

## 2024-06-14 DIAGNOSIS — H40.043: ICD-10-CM

## 2024-06-14 DIAGNOSIS — H04.123: ICD-10-CM

## 2024-06-14 DIAGNOSIS — Z96.1: ICD-10-CM

## 2024-06-14 DIAGNOSIS — H43.393: ICD-10-CM

## 2024-06-14 DIAGNOSIS — H20.011: ICD-10-CM

## 2024-06-14 DIAGNOSIS — H35.351: ICD-10-CM

## 2024-06-14 DIAGNOSIS — G43.009: ICD-10-CM

## 2024-06-14 PROCEDURE — 92014 COMPRE OPH EXAM EST PT 1/>: CPT | Performed by: OPHTHALMOLOGY

## 2024-06-14 PROCEDURE — 92134 CPTRZ OPH DX IMG PST SGM RTA: CPT | Performed by: OPHTHALMOLOGY

## 2024-06-14 ASSESSMENT — CONFRONTATIONAL VISUAL FIELD TEST (CVF)
OD_FINDINGS: FULL
OS_FINDINGS: FULL

## 2024-08-10 ENCOUNTER — RX ONLY (RX ONLY)
Age: 29
End: 2024-08-10

## 2024-08-10 ENCOUNTER — OFFICE (OUTPATIENT)
Dept: URBAN - METROPOLITAN AREA CLINIC 12 | Facility: CLINIC | Age: 29
Setting detail: OPHTHALMOLOGY
End: 2024-08-10
Payer: COMMERCIAL

## 2024-08-10 DIAGNOSIS — H04.123: ICD-10-CM

## 2024-08-10 DIAGNOSIS — H35.371: ICD-10-CM

## 2024-08-10 DIAGNOSIS — G43.009: ICD-10-CM

## 2024-08-10 DIAGNOSIS — H35.351: ICD-10-CM

## 2024-08-10 DIAGNOSIS — H40.043: ICD-10-CM

## 2024-08-10 DIAGNOSIS — H43.393: ICD-10-CM

## 2024-08-10 DIAGNOSIS — H20.011: ICD-10-CM

## 2024-08-10 PROCEDURE — 99213 OFFICE O/P EST LOW 20 MIN: CPT | Performed by: OPHTHALMOLOGY

## 2024-08-10 ASSESSMENT — CONFRONTATIONAL VISUAL FIELD TEST (CVF)
OD_FINDINGS: FULL
OS_FINDINGS: FULL

## 2024-11-09 ENCOUNTER — OFFICE (OUTPATIENT)
Dept: URBAN - METROPOLITAN AREA CLINIC 12 | Facility: CLINIC | Age: 29
Setting detail: OPHTHALMOLOGY
End: 2024-11-09
Payer: COMMERCIAL

## 2024-11-09 DIAGNOSIS — H35.351: ICD-10-CM

## 2024-11-09 DIAGNOSIS — H43.393: ICD-10-CM

## 2024-11-09 DIAGNOSIS — H04.123: ICD-10-CM

## 2024-11-09 DIAGNOSIS — H20.011: ICD-10-CM

## 2024-11-09 DIAGNOSIS — Z96.1: ICD-10-CM

## 2024-11-09 DIAGNOSIS — H40.043: ICD-10-CM

## 2024-11-09 DIAGNOSIS — H35.371: ICD-10-CM

## 2024-11-09 PROCEDURE — 92134 CPTRZ OPH DX IMG PST SGM RTA: CPT | Performed by: OPHTHALMOLOGY

## 2024-11-09 PROCEDURE — 92014 COMPRE OPH EXAM EST PT 1/>: CPT | Performed by: OPHTHALMOLOGY

## 2024-11-09 ASSESSMENT — REFRACTION_AUTOREFRACTION
OS_SPHERE: PLANO
OS_AXIS: 000
OD_CYLINDER: -0.25
OD_AXIS: 018
OS_CYLINDER: SPHERE
OD_SPHERE: PLANO

## 2024-11-09 ASSESSMENT — VISUAL ACUITY
OS_BCVA: 20/50+1
OD_BCVA: 20/15-3

## 2024-11-09 ASSESSMENT — KERATOMETRY
OD_K1POWER_DIOPTERS: 42.00
OS_AXISANGLE_DEGREES: 095
OD_K2POWER_DIOPTERS: 42.50
OS_K1POWER_DIOPTERS: 41.75
OS_K2POWER_DIOPTERS: 42.50
OD_AXISANGLE_DEGREES: 105
METHOD_AUTO_MANUAL: AUTO

## 2024-11-09 ASSESSMENT — TONOMETRY
OD_IOP_MMHG: 16
OS_IOP_MMHG: 19

## 2024-11-09 ASSESSMENT — CONFRONTATIONAL VISUAL FIELD TEST (CVF)
OD_FINDINGS: FULL
OS_FINDINGS: FULL

## 2024-11-09 ASSESSMENT — SUPERFICIAL PUNCTATE KERATITIS (SPK)
OD_SPK: T
OS_SPK: T

## 2024-12-07 ENCOUNTER — OFFICE (OUTPATIENT)
Dept: URBAN - METROPOLITAN AREA CLINIC 12 | Facility: CLINIC | Age: 29
Setting detail: OPHTHALMOLOGY
End: 2024-12-07
Payer: COMMERCIAL

## 2024-12-07 DIAGNOSIS — H35.351: ICD-10-CM

## 2024-12-07 DIAGNOSIS — Z96.1: ICD-10-CM

## 2024-12-07 DIAGNOSIS — H20.011: ICD-10-CM

## 2024-12-07 DIAGNOSIS — H04.123: ICD-10-CM

## 2024-12-07 DIAGNOSIS — H43.393: ICD-10-CM

## 2024-12-07 DIAGNOSIS — H35.371: ICD-10-CM

## 2024-12-07 DIAGNOSIS — H40.043: ICD-10-CM

## 2024-12-07 PROCEDURE — 92012 INTRM OPH EXAM EST PATIENT: CPT | Performed by: OPHTHALMOLOGY

## 2024-12-07 PROCEDURE — 92134 CPTRZ OPH DX IMG PST SGM RTA: CPT | Performed by: OPHTHALMOLOGY

## 2024-12-07 ASSESSMENT — SUPERFICIAL PUNCTATE KERATITIS (SPK)
OD_SPK: T
OS_SPK: T

## 2024-12-07 ASSESSMENT — KERATOMETRY
OD_K1POWER_DIOPTERS: 42.25
OD_AXISANGLE_DEGREES: 90
OS_K2POWER_DIOPTERS: 42.50
OD_K2POWER_DIOPTERS: 42.25
OS_K1POWER_DIOPTERS: 41.75
METHOD_AUTO_MANUAL: AUTO
OS_AXISANGLE_DEGREES: 93

## 2024-12-07 ASSESSMENT — REFRACTION_AUTOREFRACTION
OD_SPHERE: -0.25
OD_AXIS: 117
OD_CYLINDER: -0.25
OS_AXIS: 32
OS_SPHERE: -0.25
OS_CYLINDER: -0.25

## 2024-12-07 ASSESSMENT — VISUAL ACUITY
OS_BCVA: 20/40-1
OD_BCVA: 20/20

## 2024-12-07 ASSESSMENT — CONFRONTATIONAL VISUAL FIELD TEST (CVF)
OD_FINDINGS: FULL
OS_FINDINGS: FULL

## 2024-12-07 ASSESSMENT — TONOMETRY
OD_IOP_MMHG: 12
OS_IOP_MMHG: 15

## 2025-05-17 ENCOUNTER — RX ONLY (RX ONLY)
Age: 30
End: 2025-05-17

## 2025-05-17 ENCOUNTER — OFFICE (OUTPATIENT)
Dept: URBAN - METROPOLITAN AREA CLINIC 12 | Facility: CLINIC | Age: 30
Setting detail: OPHTHALMOLOGY
End: 2025-05-17
Payer: COMMERCIAL

## 2025-05-17 DIAGNOSIS — H35.371: ICD-10-CM

## 2025-05-17 DIAGNOSIS — H43.393: ICD-10-CM

## 2025-05-17 DIAGNOSIS — H20.011: ICD-10-CM

## 2025-05-17 DIAGNOSIS — H35.351: ICD-10-CM

## 2025-05-17 DIAGNOSIS — Z96.1: ICD-10-CM

## 2025-05-17 DIAGNOSIS — H04.123: ICD-10-CM

## 2025-05-17 DIAGNOSIS — H40.043: ICD-10-CM

## 2025-05-17 PROCEDURE — 92134 CPTRZ OPH DX IMG PST SGM RTA: CPT | Performed by: OPHTHALMOLOGY

## 2025-05-17 PROCEDURE — 92014 COMPRE OPH EXAM EST PT 1/>: CPT | Performed by: OPHTHALMOLOGY

## 2025-05-17 ASSESSMENT — CONFRONTATIONAL VISUAL FIELD TEST (CVF)
OS_FINDINGS: FULL
OD_FINDINGS: FULL

## 2025-05-17 ASSESSMENT — TONOMETRY: OS_IOP_MMHG: 16

## 2025-05-17 ASSESSMENT — SUPERFICIAL PUNCTATE KERATITIS (SPK)
OS_SPK: T
OD_SPK: T

## 2025-05-20 ASSESSMENT — KERATOMETRY
OD_AXISANGLE_DEGREES: 170
OS_K2POWER_DIOPTERS: 42.50
OS_K1POWER_DIOPTERS: 41.75
OS_AXISANGLE_DEGREES: 089
OD_K2POWER_DIOPTERS: 42.75
METHOD_AUTO_MANUAL: AUTO
OD_K1POWER_DIOPTERS: 42.25

## 2025-05-20 ASSESSMENT — REFRACTION_AUTOREFRACTION
OS_AXIS: 063
OD_CYLINDER: -0.50
OD_SPHERE: -0.75
OD_AXIS: 094
OS_SPHERE: -0.75
OS_CYLINDER: -0.25

## 2025-05-20 ASSESSMENT — VISUAL ACUITY
OS_BCVA: 20/125
OD_BCVA: 20/20

## 2025-05-30 ENCOUNTER — OFFICE (OUTPATIENT)
Dept: URBAN - METROPOLITAN AREA CLINIC 12 | Facility: CLINIC | Age: 30
Setting detail: OPHTHALMOLOGY
End: 2025-05-30
Payer: COMMERCIAL

## 2025-05-30 DIAGNOSIS — H43.393: ICD-10-CM

## 2025-05-30 DIAGNOSIS — H35.371: ICD-10-CM

## 2025-05-30 DIAGNOSIS — H35.351: ICD-10-CM

## 2025-05-30 DIAGNOSIS — H20.011: ICD-10-CM

## 2025-05-30 DIAGNOSIS — H40.043: ICD-10-CM

## 2025-05-30 DIAGNOSIS — H04.123: ICD-10-CM

## 2025-05-30 PROCEDURE — 99213 OFFICE O/P EST LOW 20 MIN: CPT | Performed by: OPHTHALMOLOGY

## 2025-05-30 ASSESSMENT — REFRACTION_MANIFEST
OD_VA1: 20/25
OD_AXIS: 085
OD_SPHERE: -0.75
OD_CYLINDER: -0.25

## 2025-05-30 ASSESSMENT — PACHYMETRY
OD_CT_CORRECTION: -6
OS_CT_CORRECTION: -5
OS_CT_UM: 613
OD_CT_UM: 623

## 2025-05-30 ASSESSMENT — KERATOMETRY
OD_K1POWER_DIOPTERS: 42.50
OS_AXISANGLE_DEGREES: 092
METHOD_AUTO_MANUAL: AUTO
OS_K2POWER_DIOPTERS: 42.25
OD_AXISANGLE_DEGREES: 123
OS_K1POWER_DIOPTERS: 41.75
OD_K2POWER_DIOPTERS: 43.00

## 2025-05-30 ASSESSMENT — CONFRONTATIONAL VISUAL FIELD TEST (CVF)
OD_FINDINGS: FULL
OS_FINDINGS: FULL

## 2025-05-30 ASSESSMENT — REFRACTION_AUTOREFRACTION
OD_SPHERE: -1.00
OS_CYLINDER: -0.25
OD_CYLINDER: -0.25
OS_AXIS: 046
OS_SPHERE: +0.25
OD_AXIS: 082

## 2025-05-30 ASSESSMENT — TONOMETRY: OS_IOP_MMHG: 15

## 2025-05-30 ASSESSMENT — SUPERFICIAL PUNCTATE KERATITIS (SPK)
OS_SPK: T
OD_SPK: T

## 2025-05-30 ASSESSMENT — VISUAL ACUITY
OD_BCVA: 20/20
OS_BCVA: 20/60

## 2025-06-20 ENCOUNTER — OFFICE (OUTPATIENT)
Dept: URBAN - METROPOLITAN AREA CLINIC 12 | Facility: CLINIC | Age: 30
Setting detail: OPHTHALMOLOGY
End: 2025-06-20
Payer: COMMERCIAL

## 2025-06-20 DIAGNOSIS — H35.371: ICD-10-CM

## 2025-06-20 DIAGNOSIS — H40.043: ICD-10-CM

## 2025-06-20 DIAGNOSIS — H04.123: ICD-10-CM

## 2025-06-20 DIAGNOSIS — H43.393: ICD-10-CM

## 2025-06-20 DIAGNOSIS — H35.351: ICD-10-CM

## 2025-06-20 DIAGNOSIS — H20.011: ICD-10-CM

## 2025-06-20 PROCEDURE — 92012 INTRM OPH EXAM EST PATIENT: CPT | Performed by: OPHTHALMOLOGY

## 2025-06-20 ASSESSMENT — REFRACTION_MANIFEST
OD_AXIS: 085
OD_SPHERE: -0.75
OD_CYLINDER: -0.25
OD_VA1: 20/25

## 2025-06-20 ASSESSMENT — PACHYMETRY
OD_CT_CORRECTION: -6
OS_CT_UM: 613
OD_CT_UM: 623
OS_CT_CORRECTION: -5

## 2025-06-20 ASSESSMENT — REFRACTION_AUTOREFRACTION
OD_SPHERE: -0.75
OS_SPHERE: -0.25
OS_CYLINDER: -0.25
OS_AXIS: 050
OD_AXIS: 095
OD_CYLINDER: -0.75

## 2025-06-20 ASSESSMENT — KERATOMETRY
OS_AXISANGLE_DEGREES: 092
OD_K2POWER_DIOPTERS: 42.50
OD_AXISANGLE_DEGREES: 160
OS_K1POWER_DIOPTERS: 41.75
METHOD_AUTO_MANUAL: AUTO
OD_K1POWER_DIOPTERS: 42.25
OS_K2POWER_DIOPTERS: 42.50

## 2025-06-20 ASSESSMENT — VISUAL ACUITY
OD_BCVA: 20/20
OS_BCVA: 20/50

## 2025-06-20 ASSESSMENT — SUPERFICIAL PUNCTATE KERATITIS (SPK)
OD_SPK: T
OS_SPK: T

## 2025-06-20 ASSESSMENT — CONFRONTATIONAL VISUAL FIELD TEST (CVF)
OD_FINDINGS: FULL
OS_FINDINGS: FULL

## 2025-06-20 ASSESSMENT — TONOMETRY: OS_IOP_MMHG: 14

## 2025-07-01 ENCOUNTER — RX ONLY (RX ONLY)
Age: 30
End: 2025-07-01

## 2025-07-01 ENCOUNTER — OFFICE (OUTPATIENT)
Dept: URBAN - METROPOLITAN AREA CLINIC 12 | Facility: CLINIC | Age: 30
Setting detail: OPHTHALMOLOGY
End: 2025-07-01
Payer: COMMERCIAL

## 2025-07-01 DIAGNOSIS — H04.123: ICD-10-CM

## 2025-07-01 DIAGNOSIS — H35.351: ICD-10-CM

## 2025-07-01 DIAGNOSIS — H20.011: ICD-10-CM

## 2025-07-01 DIAGNOSIS — H35.371: ICD-10-CM

## 2025-07-01 DIAGNOSIS — H43.393: ICD-10-CM

## 2025-07-01 DIAGNOSIS — H40.043: ICD-10-CM

## 2025-07-01 PROCEDURE — 92012 INTRM OPH EXAM EST PATIENT: CPT | Performed by: OPHTHALMOLOGY

## 2025-07-01 ASSESSMENT — REFRACTION_AUTOREFRACTION
OD_SPHERE: -1.00
OS_SPHERE: -0.50
OS_CYLINDER: SPH
OD_CYLINDER: -0.50
OS_AXIS: 000
OD_AXIS: 071

## 2025-07-01 ASSESSMENT — KERATOMETRY
OS_AXISANGLE_DEGREES: 090
OD_K2POWER_DIOPTERS: 46.00
OS_K2POWER_DIOPTERS: 42.50
METHOD_AUTO_MANUAL: AUTO
OD_AXISANGLE_DEGREES: 143
OD_K1POWER_DIOPTERS: 42.50
OS_K1POWER_DIOPTERS: 41.75

## 2025-07-01 ASSESSMENT — VISUAL ACUITY
OS_BCVA: 20/60
OD_BCVA: 20/20

## 2025-07-01 ASSESSMENT — PACHYMETRY
OS_CT_UM: 613
OD_CT_UM: 623
OS_CT_CORRECTION: -5
OD_CT_CORRECTION: -6

## 2025-07-01 ASSESSMENT — REFRACTION_MANIFEST
OD_CYLINDER: -0.25
OD_VA1: 20/25
OD_AXIS: 085
OD_SPHERE: -0.75

## 2025-07-01 ASSESSMENT — SUPERFICIAL PUNCTATE KERATITIS (SPK)
OS_SPK: T
OD_SPK: T

## 2025-07-01 ASSESSMENT — CONFRONTATIONAL VISUAL FIELD TEST (CVF)
OS_FINDINGS: FULL
OD_FINDINGS: FULL

## 2025-07-01 ASSESSMENT — TONOMETRY: OS_IOP_MMHG: 19

## 2025-07-22 ENCOUNTER — OFFICE (OUTPATIENT)
Dept: URBAN - METROPOLITAN AREA CLINIC 12 | Facility: CLINIC | Age: 30
Setting detail: OPHTHALMOLOGY
End: 2025-07-22
Payer: COMMERCIAL

## 2025-07-22 DIAGNOSIS — H40.043: ICD-10-CM

## 2025-07-22 DIAGNOSIS — H04.123: ICD-10-CM

## 2025-07-22 DIAGNOSIS — H43.393: ICD-10-CM

## 2025-07-22 DIAGNOSIS — H35.351: ICD-10-CM

## 2025-07-22 DIAGNOSIS — H35.371: ICD-10-CM

## 2025-07-22 DIAGNOSIS — H20.011: ICD-10-CM

## 2025-07-22 PROCEDURE — 99213 OFFICE O/P EST LOW 20 MIN: CPT | Performed by: OPHTHALMOLOGY

## 2025-07-22 ASSESSMENT — PACHYMETRY
OS_CT_CORRECTION: -5
OD_CT_CORRECTION: -6
OS_CT_UM: 613
OD_CT_UM: 623

## 2025-07-22 ASSESSMENT — SUPERFICIAL PUNCTATE KERATITIS (SPK)
OS_SPK: T
OD_SPK: T

## 2025-07-22 ASSESSMENT — CONFRONTATIONAL VISUAL FIELD TEST (CVF)
OS_FINDINGS: FULL
OD_FINDINGS: FULL

## 2025-07-22 ASSESSMENT — TONOMETRY: OS_IOP_MMHG: 15

## 2025-07-24 ASSESSMENT — REFRACTION_MANIFEST
OD_CYLINDER: -0.25
OD_SPHERE: -0.75
OD_AXIS: 085
OD_VA1: 20/25

## 2025-07-24 ASSESSMENT — KERATOMETRY
OD_AXISANGLE_DEGREES: 163
OD_K1POWER_DIOPTERS: 42.25
OS_K1POWER_DIOPTERS: 41.75
OS_K2POWER_DIOPTERS: 42.50
METHOD_AUTO_MANUAL: AUTO
OS_AXISANGLE_DEGREES: 090
OD_K2POWER_DIOPTERS: 42.75

## 2025-07-24 ASSESSMENT — REFRACTION_AUTOREFRACTION
OD_AXIS: 086
OS_CYLINDER: -0.25
OS_AXIS: 028
OS_SPHERE: -0.25
OD_CYLINDER: -0.50
OD_SPHERE: -1.00

## 2025-07-24 ASSESSMENT — VISUAL ACUITY
OD_BCVA: 20/20
OS_BCVA: 20/60+

## 2025-07-31 ENCOUNTER — OFFICE (OUTPATIENT)
Facility: LOCATION | Age: 30
Setting detail: OPHTHALMOLOGY
End: 2025-07-31
Payer: COMMERCIAL

## 2025-07-31 DIAGNOSIS — H35.371: ICD-10-CM

## 2025-07-31 DIAGNOSIS — H04.123: ICD-10-CM

## 2025-07-31 DIAGNOSIS — H35.351: ICD-10-CM

## 2025-07-31 DIAGNOSIS — H20.011: ICD-10-CM

## 2025-07-31 DIAGNOSIS — H43.393: ICD-10-CM

## 2025-07-31 DIAGNOSIS — Z96.1: ICD-10-CM

## 2025-07-31 DIAGNOSIS — H40.043: ICD-10-CM

## 2025-07-31 PROCEDURE — 92083 EXTENDED VISUAL FIELD XM: CPT | Performed by: OPHTHALMOLOGY

## 2025-07-31 PROCEDURE — 99213 OFFICE O/P EST LOW 20 MIN: CPT | Performed by: OPHTHALMOLOGY

## 2025-07-31 PROCEDURE — 76514 ECHO EXAM OF EYE THICKNESS: CPT | Performed by: OPHTHALMOLOGY

## 2025-07-31 PROCEDURE — 92020 GONIOSCOPY: CPT | Performed by: OPHTHALMOLOGY

## 2025-07-31 PROCEDURE — 92133 CPTRZD OPH DX IMG PST SGM ON: CPT | Performed by: OPHTHALMOLOGY

## 2025-07-31 ASSESSMENT — REFRACTION_MANIFEST
OD_SPHERE: -0.75
OD_AXIS: 085
OD_CYLINDER: -0.25
OD_VA1: 20/25

## 2025-07-31 ASSESSMENT — PACHYMETRY
OD_CT_CORRECTION: -6
OD_CT_UM: 623
OS_CT_UM: 613
OS_CT_CORRECTION: -5

## 2025-07-31 ASSESSMENT — VISUAL ACUITY
OD_BCVA: 20/20
OS_BCVA: 20/60+2

## 2025-07-31 ASSESSMENT — CONFRONTATIONAL VISUAL FIELD TEST (CVF)
OD_FINDINGS: FULL
OS_FINDINGS: FULL

## 2025-07-31 ASSESSMENT — TONOMETRY: OS_IOP_MMHG: 15

## 2025-08-12 ENCOUNTER — OFFICE (OUTPATIENT)
Dept: URBAN - METROPOLITAN AREA CLINIC 12 | Facility: CLINIC | Age: 30
Setting detail: OPHTHALMOLOGY
End: 2025-08-12
Payer: COMMERCIAL

## 2025-08-12 DIAGNOSIS — H43.393: ICD-10-CM

## 2025-08-12 DIAGNOSIS — Z96.1: ICD-10-CM

## 2025-08-12 DIAGNOSIS — H35.351: ICD-10-CM

## 2025-08-12 DIAGNOSIS — H20.011: ICD-10-CM

## 2025-08-12 DIAGNOSIS — H40.043: ICD-10-CM

## 2025-08-12 DIAGNOSIS — H04.123: ICD-10-CM

## 2025-08-12 DIAGNOSIS — H35.371: ICD-10-CM

## 2025-08-12 PROCEDURE — 92134 CPTRZ OPH DX IMG PST SGM RTA: CPT | Performed by: OPHTHALMOLOGY

## 2025-08-12 PROCEDURE — 92012 INTRM OPH EXAM EST PATIENT: CPT | Performed by: OPHTHALMOLOGY

## 2025-08-12 ASSESSMENT — KERATOMETRY
OS_K2POWER_DIOPTERS: 42.25
OS_AXISANGLE_DEGREES: 091
OD_AXISANGLE_DEGREES: 169
OD_K1POWER_DIOPTERS: 42.25
METHOD_AUTO_MANUAL: AUTO
OD_K2POWER_DIOPTERS: 42.75
OS_K1POWER_DIOPTERS: 41.75

## 2025-08-12 ASSESSMENT — SUPERFICIAL PUNCTATE KERATITIS (SPK)
OD_SPK: T
OS_SPK: T

## 2025-08-12 ASSESSMENT — CONFRONTATIONAL VISUAL FIELD TEST (CVF)
OD_FINDINGS: FULL
OS_FINDINGS: FULL

## 2025-08-12 ASSESSMENT — REFRACTION_AUTOREFRACTION
OD_AXIS: 094
OS_SPHERE: -0.25
OS_CYLINDER: -0.25
OD_CYLINDER: -1.25
OD_SPHERE: -0.75
OS_AXIS: 041

## 2025-08-12 ASSESSMENT — TONOMETRY: OS_IOP_MMHG: 16

## 2025-08-12 ASSESSMENT — PACHYMETRY
OD_CT_CORRECTION: -6
OD_CT_UM: 623
OS_CT_CORRECTION: -5
OS_CT_UM: 613

## 2025-08-12 ASSESSMENT — VISUAL ACUITY
OS_BCVA: 20/60
OD_BCVA: 20/20

## 2025-08-12 ASSESSMENT — REFRACTION_MANIFEST
OD_AXIS: 085
OD_CYLINDER: -0.25
OD_SPHERE: -0.75
OD_VA1: 20/25